# Patient Record
Sex: FEMALE | Race: WHITE | NOT HISPANIC OR LATINO | Employment: STUDENT | URBAN - NONMETROPOLITAN AREA
[De-identification: names, ages, dates, MRNs, and addresses within clinical notes are randomized per-mention and may not be internally consistent; named-entity substitution may affect disease eponyms.]

---

## 2017-03-06 ENCOUNTER — OFFICE VISIT (OUTPATIENT)
Dept: CARDIOLOGY | Facility: CLINIC | Age: 22
End: 2017-03-06

## 2017-03-06 VITALS
WEIGHT: 191 LBS | HEIGHT: 67 IN | OXYGEN SATURATION: 96 % | SYSTOLIC BLOOD PRESSURE: 132 MMHG | HEART RATE: 67 BPM | DIASTOLIC BLOOD PRESSURE: 77 MMHG | BODY MASS INDEX: 29.98 KG/M2

## 2017-03-06 DIAGNOSIS — R00.2 PALPITATIONS: Primary | ICD-10-CM

## 2017-03-06 DIAGNOSIS — R00.0 TACHYCARDIA: ICD-10-CM

## 2017-03-06 PROCEDURE — 99213 OFFICE O/P EST LOW 20 MIN: CPT | Performed by: PHYSICIAN ASSISTANT

## 2017-03-06 NOTE — PROGRESS NOTES
Problem list     Subjective   Essence Law is a 21 y.o. female     Chief Complaint   Patient presents with   • Follow-up     presents as a follow up       HPI  The patient presents back today for routine follow-up.  We had seen her in the past were we attempted to put her on atenolol because of tachycardia and palpitations.  She cannot tolerate that because of fatigue and dizziness.  She does note that her symptoms improved however with regards to palpitations and tachycardia.  We discontinue that at last visit but her on diltiazem 30 mg twice a day.  She reports that she has done much better with that medication.  She has far fewer episodes of tachycardia and palpitations.  Wouldn't work and stress, she will have some degree of tachycardia, not nearly to the severity that she had noted in the past.  She does have occasional dizziness which is mostly vertigo at times.  She has no syncope.  She relates to no PND or orthopnea.  She has no further complaints otherwise.    Current Outpatient Prescriptions   Medication Sig Dispense Refill   • cetirizine (ZyrTEC) 10 MG tablet Take 1 tablet by mouth daily.     • diltiaZEM (CARDIZEM) 30 MG tablet Take 1 tablet by mouth 2 (Two) Times a Day. 60 tablet 5   • DOCQLACE 100 MG capsule daily.     • EPIPEN 2-TOD 0.3 MG/0.3ML solution auto-injector injection prn     • ibuprofen (ADVIL,MOTRIN) 800 MG tablet      • lansoprazole (PREVACID) 30 MG capsule Take 1 capsule by mouth every morning.     • linaclotide (LINZESS) 290 MCG capsule capsule Take 145 mcg by mouth Every Morning Before Breakfast.     • montelukast (SINGULAIR) 10 MG tablet Take 1 tablet by mouth every night.     • ondansetron (ZOFRAN) 4 MG tablet Take 1 tablet by mouth every 8 (eight) hours as needed.     • polyethylene glycol (MIRALAX) packet Take  by mouth daily. Mix 1 packet in 8 ounces of liquid and drink once daily     • ranitidine (ZANTAC) 150 MG tablet Take 1 tablet by mouth 2 (two) times a day.     •  "sertraline (ZOLOFT) 100 MG tablet Daily.     • VENTOLIN  (90 BASE) MCG/ACT inhaler prn       No current facility-administered medications for this visit.        Azithromycin and Peanuts [peanut oil]    Past Medical History   Diagnosis Date   • Anxiety    • Depression    • GERD (gastroesophageal reflux disease)    • Palpitations        Social History     Social History   • Marital status: Single     Spouse name: N/A   • Number of children: N/A   • Years of education: N/A     Occupational History   • Not on file.     Social History Main Topics   • Smoking status: Never Smoker   • Smokeless tobacco: Never Used   • Alcohol use No   • Drug use: No   • Sexual activity: Not on file     Other Topics Concern   • Not on file     Social History Narrative       Family History   Problem Relation Age of Onset   • Diabetes Mother      borderline   • COPD Mother    • Coronary artery disease Mother    • Deep vein thrombosis Mother    • Depression Mother    • Hypertension Mother      essential   • Hyperlipidemia Mother    • COPD Father    • LACEY disease Father    • Lung cancer Father        Review of Systems   Constitutional: Positive for fatigue.   HENT: Positive for ear pain and sinus pressure.    Eyes: Negative.    Respiratory: Positive for shortness of breath ( has sob more at work).    Cardiovascular: Positive for chest pain (more at work ) and palpitations. Negative for leg swelling.   Gastrointestinal: Positive for constipation.   Endocrine: Negative.    Genitourinary: Negative.    Musculoskeletal: Negative.    Skin: Negative.    Allergic/Immunologic: Positive for environmental allergies.   Neurological: Positive for headaches.   Hematological: Bruises/bleeds easily.   Psychiatric/Behavioral: The patient is nervous/anxious.        Objective   Visit Vitals   • /77 (BP Location: Left arm, Patient Position: Sitting)   • Pulse 67   • Ht 67\" (170.2 cm)   • Wt 191 lb (86.6 kg)   • SpO2 96%   • BMI 29.91 kg/m2     Lab " Results (most recent)     None        Physical Exam   Constitutional: She is oriented to person, place, and time. She appears well-developed and well-nourished. No distress.   HENT:   Head: Normocephalic and atraumatic.   Eyes: Conjunctivae and EOM are normal. Pupils are equal, round, and reactive to light.   Neck: Normal range of motion. Neck supple. No JVD present. No tracheal deviation present.   Cardiovascular: Normal rate, regular rhythm, normal heart sounds and intact distal pulses.    Pulmonary/Chest: Effort normal and breath sounds normal.   Abdominal: Soft. Bowel sounds are normal. She exhibits no distension and no mass. There is no tenderness. There is no rebound and no guarding.   Musculoskeletal: Normal range of motion. She exhibits no edema, tenderness or deformity.   Neurological: She is alert and oriented to person, place, and time.   Skin: Skin is warm and dry. No rash noted. No erythema. No pallor.   Psychiatric: She has a normal mood and affect. Her behavior is normal. Judgment and thought content normal.   Nursing note and vitals reviewed.        Procedure   Procedures       Assessment/Plan      Diagnosis Plan   1. Palpitations     2. Tachycardia       The patient seems improved on diltiazem.  Her palpitations and tachycardia are largely nonproblematic.  Prior workup is been unremarkable.  I feel nothing further is indicated.  We'll see the patient back in 6 months, sooner if indicated by course.

## 2017-10-03 ENCOUNTER — OFFICE VISIT (OUTPATIENT)
Dept: CARDIOLOGY | Facility: CLINIC | Age: 22
End: 2017-10-03

## 2017-10-03 VITALS
OXYGEN SATURATION: 100 % | HEIGHT: 67 IN | HEART RATE: 68 BPM | SYSTOLIC BLOOD PRESSURE: 128 MMHG | DIASTOLIC BLOOD PRESSURE: 74 MMHG | WEIGHT: 187 LBS | BODY MASS INDEX: 29.35 KG/M2

## 2017-10-03 DIAGNOSIS — R00.0 TACHYCARDIA: ICD-10-CM

## 2017-10-03 DIAGNOSIS — R00.2 PALPITATIONS: Primary | ICD-10-CM

## 2017-10-03 PROCEDURE — 99213 OFFICE O/P EST LOW 20 MIN: CPT | Performed by: PHYSICIAN ASSISTANT

## 2017-10-03 RX ORDER — NORETHINDRONE AND ETHINYL ESTRADIOL 7 DAYS X 3
KIT ORAL NIGHTLY
COMMUNITY
Start: 2017-09-05

## 2017-10-03 RX ORDER — AZELASTINE 1 MG/ML
SPRAY, METERED NASAL
COMMUNITY
Start: 2017-09-06 | End: 2020-01-29

## 2017-10-03 NOTE — PROGRESS NOTES
Problem list     Subjective   Essence Law is a 21 y.o. female     Chief Complaint   Patient presents with   • Palpitations     Here for 6 mo. f/u   • Heartburn       HPI  The patient presents back in today for routine follow-up.  Her palpitations and tachycardic episodes of been very minimal since taking diltiazem at low dose.  The patient currently has no chest pain.  She reports stable dyspnea.  She relates to no PND orthopnea.  Previous cardiac testing including echocardiogram have been unremarkable.  Her event monitor indicated sinus tachycardia.  She has no further complaints otherwise and again feels that she is doing well current medical regimen.    Current Outpatient Prescriptions   Medication Sig Dispense Refill   • azelastine (ASTELIN) 0.1 % nasal spray 1 spray each nare bid     • cetirizine (ZyrTEC) 10 MG tablet Take 1 tablet by mouth daily.     • diltiaZEM (CARDIZEM) 30 MG tablet Take 1 tablet by mouth 2 (Two) Times a Day. 60 tablet 5   • DOCQLACE 100 MG capsule Daily As Needed.     • ibuprofen (ADVIL,MOTRIN) 800 MG tablet prn     • linaclotide (LINZESS) 290 MCG capsule capsule Take 145 mcg by mouth Daily As Needed.     • montelukast (SINGULAIR) 10 MG tablet Take 1 tablet by mouth every night.     • NORTREL 7/7/7 0.5/0.75/1-35 MG-MCG per tablet Every Night.     • ondansetron (ZOFRAN) 4 MG tablet Take 1 tablet by mouth every 8 (eight) hours as needed.     • ranitidine (ZANTAC) 150 MG tablet Take 1 tablet by mouth 2 (two) times a day.     • sertraline (ZOLOFT) 100 MG tablet Daily.     • VENTOLIN  (90 BASE) MCG/ACT inhaler prn     • EPIPEN 2-TOD 0.3 MG/0.3ML solution auto-injector injection prn       No current facility-administered medications for this visit.        Azithromycin and Peanuts [peanut oil]    Past Medical History:   Diagnosis Date   • Anxiety    • Depression    • GERD (gastroesophageal reflux disease)    • Palpitations        Social History     Social History   • Marital status:  "Single     Spouse name: N/A   • Number of children: N/A   • Years of education: N/A     Occupational History   • Not on file.     Social History Main Topics   • Smoking status: Never Smoker   • Smokeless tobacco: Never Used   • Alcohol use No   • Drug use: No   • Sexual activity: Not on file     Other Topics Concern   • Not on file     Social History Narrative       Family History   Problem Relation Age of Onset   • Diabetes Mother      borderline   • COPD Mother    • Coronary artery disease Mother    • Deep vein thrombosis Mother    • Depression Mother    • Hypertension Mother      essential   • Hyperlipidemia Mother    • COPD Father    • LACEY disease Father    • Lung cancer Father        Review of Systems   Constitutional: Negative.    HENT: Negative.    Eyes: Positive for visual disturbance (supposed to wear glasses).   Respiratory: Positive for shortness of breath (occas.).    Cardiovascular: Positive for palpitations (once). Negative for leg swelling.   Gastrointestinal: Negative.    Endocrine: Negative.    Genitourinary: Negative.    Musculoskeletal: Negative.    Skin: Negative.    Allergic/Immunologic: Positive for environmental allergies.   Neurological: Positive for dizziness and light-headedness.   Hematological: Bruises/bleeds easily (bruise).   Psychiatric/Behavioral: Negative.        Objective   /74 (BP Location: Left arm, Patient Position: Sitting)  Pulse 68  Ht 67\" (170.2 cm)  Wt 187 lb (84.8 kg)  SpO2 100%  BMI 29.29 kg/m2  Lab Results (most recent)     None        Physical Exam   Constitutional: She is oriented to person, place, and time. She appears well-developed and well-nourished. No distress.   HENT:   Head: Normocephalic and atraumatic.   Eyes: Conjunctivae and EOM are normal. Pupils are equal, round, and reactive to light.   Neck: Normal range of motion. Neck supple. No JVD present. No tracheal deviation present.   Cardiovascular: Normal rate, regular rhythm, normal heart sounds and " intact distal pulses.    Pulmonary/Chest: Effort normal and breath sounds normal.   Abdominal: Soft. Bowel sounds are normal. She exhibits no distension and no mass. There is no tenderness. There is no rebound and no guarding.   Musculoskeletal: Normal range of motion. She exhibits no edema, tenderness or deformity.   Neurological: She is alert and oriented to person, place, and time.   Skin: Skin is warm and dry. No rash noted. No erythema. No pallor.   Psychiatric: She has a normal mood and affect. Her behavior is normal. Judgment and thought content normal.   Nursing note and vitals reviewed.        Procedure   Procedures       Assessment/Plan      Diagnosis Plan   1. Palpitations     2. Tachycardia       The patient is doing well current medical regimen.  Symptoms of dysrhythmias are minimal.  Her palpitations and tachycardia have been basically nonexistent since starting diltiazem..  Cardiac workup was benign.  I feel nothing further is indicated.  We will see the patient back in 6 was, sooner if indicated by course.

## 2018-05-02 ENCOUNTER — OFFICE VISIT (OUTPATIENT)
Dept: CARDIOLOGY | Facility: CLINIC | Age: 23
End: 2018-05-02

## 2018-05-02 VITALS
DIASTOLIC BLOOD PRESSURE: 83 MMHG | HEART RATE: 85 BPM | SYSTOLIC BLOOD PRESSURE: 120 MMHG | OXYGEN SATURATION: 99 % | HEIGHT: 67 IN | BODY MASS INDEX: 31.33 KG/M2 | WEIGHT: 199.6 LBS

## 2018-05-02 DIAGNOSIS — R00.2 PALPITATIONS: ICD-10-CM

## 2018-05-02 DIAGNOSIS — I10 ESSENTIAL HYPERTENSION: ICD-10-CM

## 2018-05-02 DIAGNOSIS — R07.2 PRECORDIAL PAIN: Primary | ICD-10-CM

## 2018-05-02 PROCEDURE — 99213 OFFICE O/P EST LOW 20 MIN: CPT | Performed by: PHYSICIAN ASSISTANT

## 2018-05-02 PROCEDURE — 93000 ELECTROCARDIOGRAM COMPLETE: CPT | Performed by: PHYSICIAN ASSISTANT

## 2018-05-02 NOTE — PATIENT INSTRUCTIONS

## 2018-05-02 NOTE — PROGRESS NOTES
Problem list     Subjective   Essence Law is a 22 y.o. female     Chief Complaint   Patient presents with   • Follow-up     patient appears in office today for follow up    • Palpitations       HPI  The patient presents back in today for routine evaluation follow-up.  She continues to do well from cardiovascular standpoint.  Since taking diltiazem, she has had no chest pain of any significance.  Her palpitations have resolved.  Blood pressures are now very well-controlled.  She was hypertensive for some time.  The patient has stable dyspnea.  She denies PND orthopnea.  Exercise capacity is adequate and without limitation.  She describes dizziness at times which she was concerned could be medication related.  With further evaluation however the patient clearly has vertigo.  I reviewed this with her.  She has no further complaints otherwise and feels that she is doing well.    Current Outpatient Prescriptions   Medication Sig Dispense Refill   • azelastine (ASTELIN) 0.1 % nasal spray 1 spray each nare bid     • diltiaZEM (CARDIZEM) 30 MG tablet Take 1 tablet by mouth 2 (Two) Times a Day. 60 tablet 5   • EPIPEN 2-TOD 0.3 MG/0.3ML solution auto-injector injection prn     • fluticasone (VERAMYST) 27.5 MCG/SPRAY nasal spray 2 sprays into each nostril Daily.     • montelukast (SINGULAIR) 10 MG tablet Take 1 tablet by mouth every night.     • NORTREL 7/7/7 0.5/0.75/1-35 MG-MCG per tablet Every Night.     • ranitidine (ZANTAC) 150 MG tablet Take 1 tablet by mouth 2 (two) times a day.     • sertraline (ZOLOFT) 100 MG tablet Take 100 mg by mouth Daily.       No current facility-administered medications for this visit.        Azithromycin and Peanuts [peanut oil]    Past Medical History:   Diagnosis Date   • Anxiety    • Depression    • GERD (gastroesophageal reflux disease)    • Palpitations        Social History     Social History   • Marital status: Single     Spouse name: N/A   • Number of children: N/A   • Years of  education: N/A     Occupational History   • Not on file.     Social History Main Topics   • Smoking status: Never Smoker   • Smokeless tobacco: Never Used   • Alcohol use No   • Drug use: No   • Sexual activity: Defer     Other Topics Concern   • Not on file     Social History Narrative   • No narrative on file       Family History   Problem Relation Age of Onset   • Diabetes Mother      borderline   • COPD Mother    • Coronary artery disease Mother    • Deep vein thrombosis Mother    • Depression Mother    • Hypertension Mother      essential   • Hyperlipidemia Mother    • COPD Father    • LACEY disease Father    • Lung cancer Father        Review of Systems   Constitutional: Negative.  Negative for fatigue.   HENT: Positive for rhinorrhea (runny nose due to allergies). Negative for congestion, sneezing and sore throat.    Eyes: Negative.  Negative for visual disturbance.   Respiratory: Positive for shortness of breath (with exertion only). Negative for apnea, cough, chest tightness and wheezing.    Cardiovascular: Positive for palpitations (occasional palpitations). Negative for chest pain (denies CP) and leg swelling.   Gastrointestinal: Negative.  Negative for abdominal distention, abdominal pain, nausea and vomiting.   Endocrine: Negative.  Negative for cold intolerance, heat intolerance, polyphagia and polyuria.   Genitourinary: Negative.  Negative for difficulty urinating, frequency and urgency.   Musculoskeletal: Negative.  Negative for arthralgias, back pain, myalgias, neck pain and neck stiffness.   Skin: Negative.  Negative for rash and wound.   Allergic/Immunologic: Positive for environmental allergies (seasonal ) and food allergies (peanuts and shellfish).   Neurological: Negative.  Negative for dizziness, weakness, light-headedness and headaches.   Hematological: Negative.  Does not bruise/bleed easily.   Psychiatric/Behavioral: Negative for agitation, confusion and sleep disturbance (denies waking up  "smothering/SOA). The patient is nervous/anxious (easily nervous/anxious).        Objective   Vitals:    05/02/18 1044   BP: 120/83   BP Location: Left arm   Patient Position: Sitting   Pulse: 85   SpO2: 99%   Weight: 90.5 kg (199 lb 9.6 oz)   Height: 170.2 cm (67\")      /83 (BP Location: Left arm, Patient Position: Sitting)   Pulse 85   Ht 170.2 cm (67\")   Wt 90.5 kg (199 lb 9.6 oz)   SpO2 99%   BMI 31.26 kg/m²    Lab Results (most recent)     None        Physical Exam   Constitutional: She is oriented to person, place, and time. She appears well-developed and well-nourished. No distress.   HENT:   Head: Normocephalic and atraumatic.   Eyes: Conjunctivae and EOM are normal. Pupils are equal, round, and reactive to light.   Neck: Normal range of motion. Neck supple. No JVD present. No tracheal deviation present.   Cardiovascular: Normal rate, regular rhythm, normal heart sounds and intact distal pulses.    Pulmonary/Chest: Effort normal and breath sounds normal.   Abdominal: Soft. Bowel sounds are normal. She exhibits no distension and no mass. There is no tenderness. There is no rebound and no guarding.   Musculoskeletal: Normal range of motion. She exhibits no edema, tenderness or deformity.   Neurological: She is alert and oriented to person, place, and time.   Skin: Skin is warm and dry. No rash noted. No erythema. No pallor.   Psychiatric: She has a normal mood and affect. Her behavior is normal. Judgment and thought content normal.   Nursing note and vitals reviewed.        Procedure     ECG 12 Lead  Date/Time: 5/2/2018 11:13 AM  Performed by: SESAR LAMAS  Authorized by: SESAR LAMAS   Comments: Sinus rhythm at 69, normal axis, early repolarization changes, no acute changes noted.               Assessment/Plan      Diagnosis Plan   1. Precordial pain     2. Palpitations     3. Essential hypertension       The patient is stable at this time.  Palpitations are minimal on diltiazem.  Blood " pressures are now running normal with that medication.  I will make no changes.  We will see her back in 6 months, sooner if indicated by course.  She will call for complications prior to follow-up.             Patient's Body mass index is 31.26 kg/m². BMI is above normal parameters. Follow-up plan includes:  educational material and referral to primary care.             Electronically signed by:

## 2019-01-16 ENCOUNTER — OFFICE VISIT (OUTPATIENT)
Dept: CARDIOLOGY | Facility: CLINIC | Age: 24
End: 2019-01-16

## 2019-01-16 VITALS
HEIGHT: 67 IN | SYSTOLIC BLOOD PRESSURE: 134 MMHG | HEART RATE: 71 BPM | BODY MASS INDEX: 33.74 KG/M2 | WEIGHT: 215 LBS | OXYGEN SATURATION: 100 % | DIASTOLIC BLOOD PRESSURE: 76 MMHG

## 2019-01-16 DIAGNOSIS — R00.0 TACHYCARDIA: ICD-10-CM

## 2019-01-16 DIAGNOSIS — R07.2 PRECORDIAL PAIN: ICD-10-CM

## 2019-01-16 DIAGNOSIS — R00.2 PALPITATIONS: Primary | ICD-10-CM

## 2019-01-16 PROCEDURE — 99213 OFFICE O/P EST LOW 20 MIN: CPT | Performed by: PHYSICIAN ASSISTANT

## 2019-01-16 RX ORDER — CETIRIZINE HYDROCHLORIDE 10 MG/1
TABLET ORAL
COMMUNITY

## 2019-01-16 RX ORDER — IBUPROFEN 800 MG/1
TABLET ORAL
Refills: 5 | COMMUNITY
Start: 2018-12-31 | End: 2022-03-08

## 2019-01-16 RX ORDER — ALBUTEROL SULFATE 90 UG/1
AEROSOL, METERED RESPIRATORY (INHALATION)
Refills: 11 | COMMUNITY
Start: 2018-12-27

## 2019-01-16 NOTE — PATIENT INSTRUCTIONS

## 2019-01-16 NOTE — PROGRESS NOTES
Problem list     Subjective   Essence Law is a 23 y.o. female     Chief Complaint   Patient presents with   • Palpitations   • Hypertension       HPI  The patient presents in today for routine evaluation follow-up.  We have seen in the past in setting of chest pain and tachycardia/palpitations.  Previous cardiac workup was unremarkable as outlined in previous notes.  The patient subsequently started on diltiazem at a very low-dose.  Symptoms of chest discomfort and palpitations/tachycardia of largely resolved.  She has only rare episodes of chest discomfort, very minor at this time.  She also has only very rare episodes of palpitations and tachycardia.  The patient has no dizziness or syncope.  She reports adequate exercise capacity.  She has stable dyspnea.  She has no further complaints otherwise and feels she is doing well at this time.    Current Outpatient Medications   Medication Sig Dispense Refill   • azelastine (ASTELIN) 0.1 % nasal spray 1 spray each nare bid     • cetirizine (zyrTEC) 10 MG tablet cetirizine 10 mg tablet   Take 1 tablet every day by oral route.     • diltiaZEM (CARDIZEM) 30 MG tablet Take 1 tablet by mouth 2 (Two) Times a Day. 60 tablet 5   • EPIPEN 2-TOD 0.3 MG/0.3ML solution auto-injector injection prn     • fluticasone (VERAMYST) 27.5 MCG/SPRAY nasal spray 2 sprays into each nostril Daily.     • ibuprofen (ADVIL,MOTRIN) 800 MG tablet TAKE 1 TABLET BY MOUTH UP TO THREE TIMES A DAY WITH FOOD  5   • montelukast (SINGULAIR) 10 MG tablet Take 1 tablet by mouth every night.     • NORTREL 7/7/7 0.5/0.75/1-35 MG-MCG per tablet Every Night.     • ranitidine (ZANTAC) 150 MG tablet Take 1 tablet by mouth 2 (two) times a day.     • sertraline (ZOLOFT) 100 MG tablet Take 100 mg by mouth Daily.     • VENTOLIN  (90 Base) MCG/ACT inhaler INHALE 2 PUFFS BY MOUTH FOUR TIMES A DAY AS NEEDED FOR COUGH WHEEZE  11     No current facility-administered medications for this visit.         Azithromycin and Peanuts [peanut oil]    Past Medical History:   Diagnosis Date   • Anxiety    • Depression    • GERD (gastroesophageal reflux disease)    • Palpitations        Social History     Socioeconomic History   • Marital status: Single     Spouse name: Not on file   • Number of children: Not on file   • Years of education: Not on file   • Highest education level: Not on file   Social Needs   • Financial resource strain: Not on file   • Food insecurity - worry: Not on file   • Food insecurity - inability: Not on file   • Transportation needs - medical: Not on file   • Transportation needs - non-medical: Not on file   Occupational History   • Not on file   Tobacco Use   • Smoking status: Never Smoker   • Smokeless tobacco: Never Used   Substance and Sexual Activity   • Alcohol use: No   • Drug use: No   • Sexual activity: Defer   Other Topics Concern   • Not on file   Social History Narrative   • Not on file       Family History   Problem Relation Age of Onset   • Diabetes Mother         borderline   • COPD Mother    • Coronary artery disease Mother    • Deep vein thrombosis Mother    • Depression Mother    • Hypertension Mother         essential   • Hyperlipidemia Mother    • COPD Father    • LACEY disease Father    • Lung cancer Father        Review of Systems   Constitutional: Positive for fatigue (easily fatigued).   HENT: Negative.  Negative for congestion, rhinorrhea, sneezing and sore throat.    Eyes: Negative.  Negative for visual disturbance.   Respiratory: Positive for shortness of breath (easily SOA; worse on exertion ). Negative for apnea, cough, chest tightness and wheezing.    Cardiovascular: Positive for palpitations (occasional palpitations). Negative for chest pain (denies CP) and leg swelling.   Gastrointestinal: Negative.  Negative for abdominal distention, abdominal pain, nausea and vomiting.   Endocrine: Negative.  Negative for cold intolerance and heat intolerance.   Genitourinary:  "Negative.  Negative for difficulty urinating, frequency and urgency.   Musculoskeletal: Negative.  Negative for arthralgias, back pain, myalgias, neck pain and neck stiffness.   Skin: Negative.  Negative for rash and wound.   Allergic/Immunologic: Positive for environmental allergies (seasonal allergies) and food allergies (nuts).   Neurological: Negative.  Negative for dizziness, syncope, weakness, light-headedness and headaches.   Hematological: Negative.  Does not bruise/bleed easily.   Psychiatric/Behavioral: Positive for agitation (easily agitated). Negative for confusion and sleep disturbance (denies waking up smothering/SOA). The patient is nervous/anxious (easily anxious).        Objective   Vitals:    01/16/19 1528   BP: 134/76   BP Location: Left arm   Patient Position: Sitting   Cuff Size: Adult   Pulse: 71   SpO2: 100%   Weight: 97.5 kg (215 lb)   Height: 170.2 cm (67.01\")      /76 (BP Location: Left arm, Patient Position: Sitting, Cuff Size: Adult)   Pulse 71   Ht 170.2 cm (67.01\")   Wt 97.5 kg (215 lb)   SpO2 100%   BMI 33.67 kg/m²    Lab Results (most recent)     None        Physical Exam   Constitutional: She is oriented to person, place, and time. She appears well-developed and well-nourished. No distress.   HENT:   Head: Normocephalic and atraumatic.   Eyes: Conjunctivae and EOM are normal. Pupils are equal, round, and reactive to light.   Neck: Normal range of motion. Neck supple. No JVD present. No tracheal deviation present.   Cardiovascular: Normal rate, regular rhythm, normal heart sounds and intact distal pulses.   Pulmonary/Chest: Effort normal and breath sounds normal.   Abdominal: Soft. Bowel sounds are normal. She exhibits no distension and no mass. There is no tenderness. There is no rebound and no guarding.   Musculoskeletal: Normal range of motion. She exhibits no edema, tenderness or deformity.   Neurological: She is alert and oriented to person, place, and time.   Skin: " Skin is warm and dry. No rash noted. No erythema. No pallor.   Psychiatric: She has a normal mood and affect. Her behavior is normal. Judgment and thought content normal.   Nursing note and vitals reviewed.        Procedure   Procedures       Assessment/Plan      Diagnosis Plan   1. Palpitations     2. Tachycardia     3. Precordial pain           The patient is doing well on diltiazem therapy with very infrequent episodes of chest pain, palpitations, tachycardia.  She is doing well, I will make no changes to medical regimen.  We can see the patient back in 6 months, sooner if indicated by course.  She will call for any issues prior to follow-up.       Patient's Body mass index is 33.67 kg/m². BMI is above normal parameters. Recommendations include: educational material and referral to primary care.             Electronically signed by:

## 2019-07-24 ENCOUNTER — OFFICE VISIT (OUTPATIENT)
Dept: CARDIOLOGY | Facility: CLINIC | Age: 24
End: 2019-07-24

## 2019-07-24 VITALS
HEART RATE: 96 BPM | HEIGHT: 67 IN | BODY MASS INDEX: 33.56 KG/M2 | DIASTOLIC BLOOD PRESSURE: 84 MMHG | OXYGEN SATURATION: 97 % | WEIGHT: 213.8 LBS | SYSTOLIC BLOOD PRESSURE: 135 MMHG

## 2019-07-24 DIAGNOSIS — R00.2 PALPITATIONS: Primary | ICD-10-CM

## 2019-07-24 DIAGNOSIS — R00.0 TACHYCARDIA: ICD-10-CM

## 2019-07-24 DIAGNOSIS — R06.02 SHORTNESS OF BREATH: ICD-10-CM

## 2019-07-24 PROCEDURE — 99213 OFFICE O/P EST LOW 20 MIN: CPT | Performed by: PHYSICIAN ASSISTANT

## 2019-07-24 RX ORDER — ACETAMINOPHEN 160 MG
2000 TABLET,DISINTEGRATING ORAL DAILY
Refills: 12 | COMMUNITY
Start: 2019-07-08 | End: 2022-03-08

## 2019-07-24 NOTE — PATIENT INSTRUCTIONS

## 2019-07-24 NOTE — PROGRESS NOTES
Problem list     Subjective   Essence Law is a 23 y.o. female     Chief Complaint   Patient presents with   • Palpitations       HPI    The patient presents in today for routine evaluation follow-up.  We have seen in the past in setting of palpitations and tachycardia.  She also has had chest pain shortness of air in the past.  We did schedule her for evaluation previously.  Noninvasive cardiac work-up was benign at that time.  Because of ongoing symptoms of palpitations/tachycardia, we did start the patient on low-dose diltiazem and have followed her longitudinally through the clinic.  She has responded well for the most part to diltiazem therapy.  Currently, the patient reports only rare palpitations/tachycardia.  She has stable dyspnea.  Her chest pain is resolved.  She has no dizziness or syncope.  She has no further complaints otherwise at this time.  Current Outpatient Medications   Medication Sig Dispense Refill   • azelastine (ASTELIN) 0.1 % nasal spray 1 spray each nare bid     • cetirizine (zyrTEC) 10 MG tablet cetirizine 10 mg tablet   Take 1 tablet every day by oral route.     • Cholecalciferol (VITAMIN D3) 2000 units capsule Take 2,000 Units by mouth Daily.  12   • diltiaZEM (CARDIZEM) 30 MG tablet Take 1 tablet by mouth 2 (Two) Times a Day. 60 tablet 5   • EPIPEN 2-TOD 0.3 MG/0.3ML solution auto-injector injection prn     • fluticasone (VERAMYST) 27.5 MCG/SPRAY nasal spray 2 sprays into each nostril Daily.     • ibuprofen (ADVIL,MOTRIN) 800 MG tablet TAKE 1 TABLET BY MOUTH UP TO THREE TIMES A DAY WITH FOOD  5   • montelukast (SINGULAIR) 10 MG tablet Take 1 tablet by mouth every night.     • NORTREL 7/7/7 0.5/0.75/1-35 MG-MCG per tablet Every Night.     • ranitidine (ZANTAC) 150 MG tablet Take 1 tablet by mouth 2 (two) times a day.     • sertraline (ZOLOFT) 100 MG tablet Take 100 mg by mouth Daily.     • VENTOLIN  (90 Base) MCG/ACT inhaler INHALE 2 PUFFS BY MOUTH FOUR TIMES A DAY AS NEEDED  FOR COUGH WHEEZE  11   • vitamin E 200 UNIT capsule Take 400 Units by mouth Daily.  0     No current facility-administered medications for this visit.        Azithromycin and Peanuts [peanut oil]    Past Medical History:   Diagnosis Date   • Anxiety    • Depression    • GERD (gastroesophageal reflux disease)    • Palpitations        Social History     Socioeconomic History   • Marital status: Single     Spouse name: Not on file   • Number of children: Not on file   • Years of education: Not on file   • Highest education level: Not on file   Tobacco Use   • Smoking status: Never Smoker   • Smokeless tobacco: Never Used   Substance and Sexual Activity   • Alcohol use: No   • Drug use: No   • Sexual activity: Not Currently     Comment: On the pill to control periods       Family History   Problem Relation Age of Onset   • Diabetes Mother         borderline   • COPD Mother    • Coronary artery disease Mother    • Deep vein thrombosis Mother    • Depression Mother    • Hypertension Mother         essential   • Hyperlipidemia Mother    • Heart disease Mother    • COPD Father    • LACEY disease Father    • Lung cancer Father    • Heart attack Paternal Aunt         Congestive haert failure   • Heart disease Paternal Aunt    • Heart failure Paternal Aunt    • Heart attack Paternal Uncle         Heart  attack   • Heart failure Paternal Uncle    • Heart attack Paternal Uncle         Massive heart attack,    • Heart failure Paternal Uncle    • Heart attack Paternal Grandfather         Massive heart attack,    • Heart disease Paternal Grandfather    • Heart attack Paternal Grandmother         Massive hesrt attack,    • Heart disease Paternal Grandmother    • Heart failure Paternal Uncle         Massive heart attack,        Review of Systems   Constitutional: Positive for fatigue (easily fatigued).   HENT: Negative.  Negative for congestion, rhinorrhea and sneezing.    Eyes: Negative.  Negative for  "visual disturbance.   Respiratory: Positive for shortness of breath (easily SOA on exertion only). Negative for cough, chest tightness and wheezing.    Cardiovascular: Positive for palpitations (occasional palpitations). Negative for chest pain and leg swelling.   Gastrointestinal: Negative.  Negative for abdominal pain, nausea and vomiting.   Endocrine: Negative.  Negative for cold intolerance and heat intolerance.   Genitourinary: Negative.  Negative for difficulty urinating, frequency and urgency.   Musculoskeletal: Negative.  Negative for arthralgias, back pain, neck pain and neck stiffness.   Skin: Negative.  Negative for rash and wound.   Allergic/Immunologic: Positive for food allergies (peanuts). Negative for environmental allergies.   Neurological: Negative.  Negative for dizziness, syncope, weakness, light-headedness and headaches.   Hematological: Bruises/bleeds easily (bruises easily).   Psychiatric/Behavioral: Positive for agitation (easily agitated) and confusion (easily confused). Negative for sleep disturbance (denies waking up smothering/SOA). The patient is not nervous/anxious.        Objective   Vitals:    07/24/19 1337   BP: 135/84   BP Location: Left arm   Patient Position: Sitting   Pulse: 96   SpO2: 97%   Weight: 97 kg (213 lb 12.8 oz)   Height: 170.2 cm (67\")      /84 (BP Location: Left arm, Patient Position: Sitting)   Pulse 96   Ht 170.2 cm (67\")   Wt 97 kg (213 lb 12.8 oz)   SpO2 97%   BMI 33.49 kg/m²    Lab Results (most recent)     None        Physical Exam   Constitutional: She is oriented to person, place, and time. She appears well-developed and well-nourished. No distress.   HENT:   Head: Normocephalic and atraumatic.   Eyes: Conjunctivae and EOM are normal. Pupils are equal, round, and reactive to light.   Neck: Normal range of motion. Neck supple. No JVD present. No tracheal deviation present.   Cardiovascular: Normal rate, regular rhythm, normal heart sounds and intact " distal pulses.   Pulmonary/Chest: Effort normal and breath sounds normal.   Abdominal: Soft. Bowel sounds are normal. She exhibits no distension and no mass. There is no tenderness. There is no rebound and no guarding.   Musculoskeletal: Normal range of motion. She exhibits no edema, tenderness or deformity.   Neurological: She is alert and oriented to person, place, and time.   Skin: Skin is warm and dry. No rash noted. No erythema. No pallor.   Psychiatric: She has a normal mood and affect. Her behavior is normal. Judgment and thought content normal.   Nursing note and vitals reviewed.        Procedure   Procedures       Assessment/Plan      Diagnosis Plan   1. Palpitations     2. Tachycardia     3. Shortness of breath         1.  The patient continues to do well with diltiazem therapy.  This seems to be suppressing the majority of symptoms of palpitations and tachycardia.  I will continue that at this time without change.    2.  Previous cardiac work-up is been benign.  I do not feel anything further is indicated.  Her chest pain is resolved.  Her dyspnea is at baseline for her.  She has no symptoms otherwise.  I feel no further work-up is warranted.    3.  We will see the patient back on 6-month intervals.  Again I will continue medications without change.  She will call for any issues.  Otherwise, follow-up as above.            Patient's Body mass index is 33.49 kg/m². BMI is above normal parameters. Recommendations include: educational material and referral to primary care.             Electronically signed by:

## 2020-01-29 ENCOUNTER — OFFICE VISIT (OUTPATIENT)
Dept: CARDIOLOGY | Facility: CLINIC | Age: 25
End: 2020-01-29

## 2020-01-29 VITALS
SYSTOLIC BLOOD PRESSURE: 122 MMHG | BODY MASS INDEX: 34.44 KG/M2 | OXYGEN SATURATION: 99 % | HEART RATE: 80 BPM | HEIGHT: 67 IN | WEIGHT: 219.4 LBS | DIASTOLIC BLOOD PRESSURE: 75 MMHG

## 2020-01-29 DIAGNOSIS — R00.0 TACHYCARDIA: ICD-10-CM

## 2020-01-29 DIAGNOSIS — R00.2 PALPITATIONS: Primary | ICD-10-CM

## 2020-01-29 DIAGNOSIS — R06.02 SHORTNESS OF BREATH: ICD-10-CM

## 2020-01-29 PROCEDURE — 93000 ELECTROCARDIOGRAM COMPLETE: CPT | Performed by: PHYSICIAN ASSISTANT

## 2020-01-29 PROCEDURE — 99213 OFFICE O/P EST LOW 20 MIN: CPT | Performed by: PHYSICIAN ASSISTANT

## 2020-01-29 NOTE — PROGRESS NOTES
Problem list     Subjective   Essence Law is a 24 y.o. female     Chief Complaint   Patient presents with   • Palpitations     here for 6 month f/u       HPI  The patient presents in today for evaluation and follow-up.  We have seen this patient historically because of chest pain, palpitations, tachycardia, and dyspnea.  Previous noninvasive work-up has been very much benign.  We did evaluate the patient for dysrhythmic substrates.  Monitor telemetry was very much benign.  She has since been treated with very low-dose diltiazem.  Symptoms of palpitations/tachycardia have basically resolved.  Currently, the patient has no chest pain.  She has stable dyspnea.  She has no further complaints otherwise.    Current Outpatient Medications on File Prior to Visit   Medication Sig Dispense Refill   • cetirizine (zyrTEC) 10 MG tablet cetirizine 10 mg tablet   Take 1 tablet every day by oral route.     • Cholecalciferol (VITAMIN D3) 2000 units capsule Take 2,000 Units by mouth Daily.  12   • diltiaZEM (CARDIZEM) 30 MG tablet Take 1 tablet by mouth 2 (Two) Times a Day. 60 tablet 5   • EPIPEN 2-TOD 0.3 MG/0.3ML solution auto-injector injection prn     • ibuprofen (ADVIL,MOTRIN) 800 MG tablet TAKE 1 TABLET BY MOUTH UP TO THREE TIMES A DAY WITH FOOD  5   • montelukast (SINGULAIR) 10 MG tablet Take 1 tablet by mouth every night.     • NORTREL 7/7/7 0.5/0.75/1-35 MG-MCG per tablet Every Night.     • ranitidine (ZANTAC) 150 MG tablet Take 1 tablet by mouth 2 (two) times a day.     • sertraline (ZOLOFT) 100 MG tablet Take 100 mg by mouth Daily.     • VENTOLIN  (90 Base) MCG/ACT inhaler INHALE 2 PUFFS BY MOUTH FOUR TIMES A DAY AS NEEDED FOR COUGH WHEEZE  11   • vitamin E 200 UNIT capsule Take 400 Units by mouth Daily.  0   • [DISCONTINUED] azelastine (ASTELIN) 0.1 % nasal spray 1 spray each nare bid     • [DISCONTINUED] fluticasone (VERAMYST) 27.5 MCG/SPRAY nasal spray 2 sprays into each nostril Daily.       No current  facility-administered medications on file prior to visit.        Azithromycin and Peanuts [peanut oil]    Past Medical History:   Diagnosis Date   • Anxiety    • Depression    • GERD (gastroesophageal reflux disease)    • Palpitations        Social History     Socioeconomic History   • Marital status: Single     Spouse name: Not on file   • Number of children: Not on file   • Years of education: Not on file   • Highest education level: Not on file   Tobacco Use   • Smoking status: Never Smoker   • Smokeless tobacco: Never Used   Substance and Sexual Activity   • Alcohol use: No   • Drug use: No   • Sexual activity: Not Currently     Comment: On the pill to control periods       Family History   Problem Relation Age of Onset   • Diabetes Mother         borderline   • COPD Mother    • Coronary artery disease Mother    • Deep vein thrombosis Mother    • Depression Mother    • Hypertension Mother         essential   • Hyperlipidemia Mother    • Heart disease Mother    • COPD Father    • LACEY disease Father    • Lung cancer Father    • Heart attack Paternal Aunt         Congestive haert failure   • Heart disease Paternal Aunt    • Heart failure Paternal Aunt    • Heart attack Paternal Uncle         Heart  attack   • Heart failure Paternal Uncle    • Heart attack Paternal Uncle         Massive heart attack,    • Heart failure Paternal Uncle    • Heart attack Paternal Grandfather         Massive heart attack,    • Heart disease Paternal Grandfather    • Heart attack Paternal Grandmother         Massive hesrt attack,    • Heart disease Paternal Grandmother    • Heart failure Paternal Uncle         Massive heart attack,        Review of Systems   Constitutional: Positive for fatigue (easily fatigued).   HENT: Negative.  Negative for congestion, rhinorrhea and sore throat.    Eyes: Positive for visual disturbance (supposed to wear glasses but do not).   Respiratory: Negative.  Negative for  "cough, chest tightness and wheezing.    Cardiovascular: Negative.  Negative for chest pain, palpitations and leg swelling.   Gastrointestinal: Negative.  Negative for abdominal pain, nausea and vomiting.   Endocrine: Negative.  Negative for cold intolerance and heat intolerance.   Genitourinary: Negative.  Negative for difficulty urinating, frequency and urgency.   Musculoskeletal: Negative.  Negative for arthralgias, back pain and neck pain.   Skin: Negative.  Negative for rash and wound.   Allergic/Immunologic: Positive for environmental allergies (grass, pollen) and food allergies (tree nut).   Neurological: Positive for dizziness (occasional dizziness). Negative for syncope and light-headedness.   Hematological: Bruises/bleeds easily (bruises easily).   Psychiatric/Behavioral: Positive for agitation (easily agitated). Negative for confusion and sleep disturbance (denies waking up smothering/SOA). The patient is not nervous/anxious.        Objective   Vitals:    01/29/20 1315   BP: 122/75   BP Location: Left arm   Patient Position: Sitting   Pulse: 80   SpO2: 99%   Weight: 99.5 kg (219 lb 6.4 oz)   Height: 170.2 cm (67\")      /75 (BP Location: Left arm, Patient Position: Sitting)   Pulse 80   Ht 170.2 cm (67\")   Wt 99.5 kg (219 lb 6.4 oz)   SpO2 99%   BMI 34.36 kg/m²    Lab Results (most recent)     None        Physical Exam   Constitutional: She is oriented to person, place, and time. She appears well-developed and well-nourished. No distress.   HENT:   Head: Normocephalic and atraumatic.   Eyes: Pupils are equal, round, and reactive to light. Conjunctivae and EOM are normal.   Neck: Normal range of motion. Neck supple. No JVD present. No tracheal deviation present.   Cardiovascular: Normal rate, regular rhythm, normal heart sounds and intact distal pulses.   Pulmonary/Chest: Effort normal and breath sounds normal.   Abdominal: Soft. Bowel sounds are normal. She exhibits no distension and no mass. " There is no tenderness. There is no rebound and no guarding.   Musculoskeletal: Normal range of motion. She exhibits no edema, tenderness or deformity.   Neurological: She is alert and oriented to person, place, and time.   Skin: Skin is warm and dry. No rash noted. No erythema. No pallor.   Psychiatric: She has a normal mood and affect. Her behavior is normal. Judgment and thought content normal.   Nursing note and vitals reviewed.        Procedure     ECG 12 Lead  Date/Time: 1/29/2020 1:26 PM  Performed by: Flakito Patel PA  Authorized by: Flakito Patel PA   Comparison: compared with previous ECG from 5/2/2018  Comparison to previous ECG: Sinus rhythm at 70, normal axis, early precordial R wave progression, no acute changes noted.                 Assessment/Plan      Diagnosis Plan   1. Palpitations  ECG 12 Lead   2. Tachycardia     3. Shortness of breath       1.  Symptoms of palpitations/tachycardia are now minimal on diltiazem therapy.  I will continue that without change.    2.  Her dyspnea is minimal at this time.  She has no specific or significant cardiac symptoms or complications otherwise.    3.  Previous noninvasive work-up is been benign from cardiovascular standpoint.  I do not feel anything further is indicated.  I will continue current medical regimen, in particular diltiazem, without change.  We will continue to see the patient on 6-month intervals.          Patient's Body mass index is 34.36 kg/m². BMI is above normal parameters. Recommendations include: referral to primary care.             Electronically signed by:

## 2020-01-29 NOTE — PATIENT INSTRUCTIONS

## 2020-07-29 ENCOUNTER — OFFICE VISIT (OUTPATIENT)
Dept: CARDIOLOGY | Facility: CLINIC | Age: 25
End: 2020-07-29

## 2020-07-29 VITALS
SYSTOLIC BLOOD PRESSURE: 120 MMHG | HEIGHT: 67 IN | OXYGEN SATURATION: 99 % | BODY MASS INDEX: 36.07 KG/M2 | DIASTOLIC BLOOD PRESSURE: 76 MMHG | WEIGHT: 229.8 LBS | HEART RATE: 71 BPM | TEMPERATURE: 97.5 F

## 2020-07-29 DIAGNOSIS — R06.02 SHORTNESS OF BREATH: ICD-10-CM

## 2020-07-29 DIAGNOSIS — R00.0 TACHYCARDIA: ICD-10-CM

## 2020-07-29 DIAGNOSIS — R00.2 PALPITATIONS: Primary | ICD-10-CM

## 2020-07-29 PROCEDURE — 99213 OFFICE O/P EST LOW 20 MIN: CPT | Performed by: PHYSICIAN ASSISTANT

## 2020-07-29 RX ORDER — FAMOTIDINE 20 MG/1
20 TABLET, FILM COATED ORAL DAILY
COMMUNITY
End: 2022-03-08

## 2020-07-29 NOTE — PATIENT INSTRUCTIONS

## 2020-07-29 NOTE — PROGRESS NOTES
Problem list     Subjective   Essence Law is a 24 y.o. female     Chief Complaint   Patient presents with   • Chest Pain     presents for 6 month f/u   • Palpitations   • Shortness of Breath       HPI  The patient presents in today for routine follow-up.  We have seen her in the past in setting of chest pain, dyspnea, and palpitations.  Previous work-up from cardiovascular standpoint has been benign.  She eventually was placed on diltiazem and has been followed longitudinally.  On diltiazem, palpitations have minimized.  She reports stable dyspnea.  She has had no continued chest pain.  She reports no dizziness or syncope.  Exercise capacity is adequate and without significant limitation from cardiovascular standpoint.  The patient has no further complaints at this time.    Current Outpatient Medications on File Prior to Visit   Medication Sig Dispense Refill   • cetirizine (zyrTEC) 10 MG tablet cetirizine 10 mg tablet   Take 1 tablet every day by oral route.     • Cholecalciferol (VITAMIN D3) 2000 units capsule Take 2,000 Units by mouth Daily.  12   • EPIPEN 2-TOD 0.3 MG/0.3ML solution auto-injector injection prn     • famotidine (PEPCID) 20 MG tablet Take 20 mg by mouth Daily.     • ibuprofen (ADVIL,MOTRIN) 800 MG tablet TAKE 1 TABLET BY MOUTH UP TO THREE TIMES A DAY WITH FOOD  5   • montelukast (SINGULAIR) 10 MG tablet Take 1 tablet by mouth every night.     • NORTREL 7/7/7 0.5/0.75/1-35 MG-MCG per tablet Every Night.     • sertraline (ZOLOFT) 100 MG tablet Take 100 mg by mouth Daily.     • VENTOLIN  (90 Base) MCG/ACT inhaler INHALE 2 PUFFS BY MOUTH FOUR TIMES A DAY AS NEEDED FOR COUGH WHEEZE  11   • vitamin E 200 UNIT capsule Take 400 Units by mouth Daily.  0   • [DISCONTINUED] diltiaZEM (CARDIZEM) 30 MG tablet Take 1 tablet by mouth 2 (Two) Times a Day. 60 tablet 5   • [DISCONTINUED] ranitidine (ZANTAC) 150 MG tablet Take 1 tablet by mouth 2 (two) times a day.       No current facility-administered  medications on file prior to visit.        Peanuts [peanut oil] and Azithromycin    Past Medical History:   Diagnosis Date   • Anxiety    • Arthritis    • Bulging lumbar disc    • Depression    • GERD (gastroesophageal reflux disease)    • Palpitations        Social History     Socioeconomic History   • Marital status: Single     Spouse name: Not on file   • Number of children: Not on file   • Years of education: Not on file   • Highest education level: Not on file   Tobacco Use   • Smoking status: Never Smoker   • Smokeless tobacco: Never Used   Substance and Sexual Activity   • Alcohol use: No   • Drug use: No   • Sexual activity: Not Currently     Comment: On the pill to control periods       Family History   Problem Relation Age of Onset   • Diabetes Mother         borderline   • COPD Mother    • Coronary artery disease Mother    • Deep vein thrombosis Mother    • Depression Mother    • Hypertension Mother         essential   • Hyperlipidemia Mother    • Heart disease Mother    • COPD Father    • LACEY disease Father    • Lung cancer Father    • Heart attack Paternal Aunt         Congestive haert failure   • Heart disease Paternal Aunt    • Heart failure Paternal Aunt    • Heart attack Paternal Uncle         Heart  attack   • Heart failure Paternal Uncle    • Heart attack Paternal Uncle         Massive heart attack,    • Heart failure Paternal Uncle    • Heart attack Paternal Grandfather         Massive heart attack,    • Heart disease Paternal Grandfather    • Heart attack Paternal Grandmother         Massive hesrt attack,    • Heart disease Paternal Grandmother    • Heart failure Paternal Uncle         Massive heart attack,        Review of Systems   Constitutional: Positive for fatigue. Negative for chills, diaphoresis and fever.   HENT: Negative.    Eyes: Negative.  Negative for visual disturbance.   Respiratory: Positive for shortness of breath (on exertion and walking up  "hills). Negative for apnea, cough, chest tightness and wheezing.    Cardiovascular: Positive for chest pain (occas but no more than before) and palpitations (occas racing). Negative for leg swelling.   Gastrointestinal: Positive for constipation. Negative for abdominal pain, blood in stool, diarrhea, nausea and vomiting.   Endocrine: Negative.    Genitourinary: Negative.  Negative for hematuria.   Musculoskeletal: Positive for arthralgias and back pain. Negative for myalgias and neck pain.   Skin: Negative.  Negative for rash and wound.   Allergic/Immunologic: Positive for environmental allergies. Negative for food allergies.   Neurological: Positive for dizziness (vertigo). Negative for syncope, weakness, light-headedness, numbness and headaches.   Hematological: Bruises/bleeds easily.   Psychiatric/Behavioral: Negative.  Negative for agitation and sleep disturbance. The patient is not nervous/anxious.        Objective   Vitals:    07/29/20 1124   BP: 120/76   BP Location: Left arm   Patient Position: Sitting   Pulse: 71   Temp: 97.5 °F (36.4 °C)   SpO2: 99%   Weight: 104 kg (229 lb 12.8 oz)   Height: 170.2 cm (67.01\")      /76 (BP Location: Left arm, Patient Position: Sitting)   Pulse 71   Temp 97.5 °F (36.4 °C)   Ht 170.2 cm (67.01\")   Wt 104 kg (229 lb 12.8 oz)   SpO2 99%   BMI 35.98 kg/m²    Lab Results (most recent)     None        Physical Exam   Constitutional: She is oriented to person, place, and time. She appears well-developed and well-nourished. No distress.   HENT:   Head: Normocephalic and atraumatic.   Eyes: Pupils are equal, round, and reactive to light. Conjunctivae and EOM are normal.   Neck: Normal range of motion. Neck supple. No JVD present. No tracheal deviation present.   Cardiovascular: Normal rate, regular rhythm, normal heart sounds and intact distal pulses.   Pulmonary/Chest: Effort normal and breath sounds normal.   Abdominal: Soft. Bowel sounds are normal. She exhibits no " distension and no mass. There is no tenderness. There is no rebound and no guarding.   Musculoskeletal: Normal range of motion. She exhibits no edema, tenderness or deformity.   Neurological: She is alert and oriented to person, place, and time.   Skin: Skin is warm and dry. No rash noted. No erythema. No pallor.   Psychiatric: She has a normal mood and affect. Her behavior is normal. Judgment and thought content normal.   Nursing note and vitals reviewed.        Procedure   Procedures       Assessment/Plan      Diagnosis Plan   1. Palpitations     2. Tachycardia     3. Shortness of breath       1.  At this time, the patient continues to do well from cardiovascular standpoint.  Palpitations/tachycardic symptoms are well treated with low-dose diltiazem.  She is tolerating that without complication.  We have given her refills for that medication, which have now been sent to her pharmacy.    2.  Otherwise, the patient is doing well.  Her chest pain is now resolved.  Her dyspnea is at baseline.  Previous work-up from cardiovascular standpoint was benign.  I do not feel further work-up is warranted.    3.  We will make no changes.  We will continue to see the patient on 6-month intervals.  She will call for complications prior to follow-up.           Essence CORONADO Law  reports that she has never smoked. She has never used smokeless tobacco.      Patient's Body mass index is 35.98 kg/m². BMI is above normal parameters. Recommendations include: educational material.             Electronically signed by:

## 2021-01-29 ENCOUNTER — OFFICE VISIT (OUTPATIENT)
Dept: CARDIOLOGY | Facility: CLINIC | Age: 26
End: 2021-01-29

## 2021-01-29 VITALS
SYSTOLIC BLOOD PRESSURE: 131 MMHG | BODY MASS INDEX: 36.1 KG/M2 | HEIGHT: 67 IN | DIASTOLIC BLOOD PRESSURE: 86 MMHG | WEIGHT: 230 LBS | HEART RATE: 80 BPM | OXYGEN SATURATION: 99 %

## 2021-01-29 DIAGNOSIS — R00.2 PALPITATIONS: Primary | ICD-10-CM

## 2021-01-29 DIAGNOSIS — I10 ESSENTIAL HYPERTENSION: ICD-10-CM

## 2021-01-29 DIAGNOSIS — R06.02 SHORTNESS OF BREATH: ICD-10-CM

## 2021-01-29 PROCEDURE — 99213 OFFICE O/P EST LOW 20 MIN: CPT | Performed by: PHYSICIAN ASSISTANT

## 2021-01-29 PROCEDURE — 93000 ELECTROCARDIOGRAM COMPLETE: CPT | Performed by: PHYSICIAN ASSISTANT

## 2021-01-29 RX ORDER — OMEPRAZOLE 40 MG/1
40 CAPSULE, DELAYED RELEASE ORAL NIGHTLY
COMMUNITY

## 2021-01-29 RX ORDER — FLUTICASONE PROPIONATE 50 MCG
2 SPRAY, SUSPENSION (ML) NASAL DAILY
COMMUNITY

## 2021-01-29 RX ORDER — DILTIAZEM HYDROCHLORIDE 120 MG/1
120 CAPSULE, COATED, EXTENDED RELEASE ORAL DAILY
Qty: 30 CAPSULE | Refills: 11 | Status: SHIPPED | OUTPATIENT
Start: 2021-01-29 | End: 2021-02-05 | Stop reason: ALTCHOICE

## 2021-01-29 RX ORDER — ONDANSETRON HYDROCHLORIDE 8 MG/1
TABLET, FILM COATED ORAL EVERY 8 HOURS PRN
COMMUNITY
End: 2022-03-08

## 2021-01-29 NOTE — PROGRESS NOTES
Problem list     Subjective   Essence Law is a 25 y.o. female     Chief Complaint   Patient presents with   • Follow-up   • Palpitations       HPI  The patient presents into the clinic today for follow-up.  The patient was initially evaluated because of chest pain, dyspnea, palpitations, and tachycardia.  Previous noninvasive work-up was benign.  She eventually was started on diltiazem at very low dose, 30 mg twice daily, and to address her palpitations and tachycardia at that time.  She really had done well up until the last few months.  She feels that with stress, palpitations and tachycardia have now become a recurrent issue.  She has no sustained dysrhythmic activity.  She has no dizziness or syncope at that time.  She has chest pain only when stressed.  Her dyspnea is stable and at baseline.  She has no further complaints.    Current Outpatient Medications on File Prior to Visit   Medication Sig Dispense Refill   • cetirizine (zyrTEC) 10 MG tablet cetirizine 10 mg tablet   Take 1 tablet every day by oral route.     • Cholecalciferol (VITAMIN D3) 2000 units capsule Take 2,000 Units by mouth Daily.  12   • EPIPEN 2-TOD 0.3 MG/0.3ML solution auto-injector injection prn     • fluticasone (FLONASE) 50 MCG/ACT nasal spray 2 sprays into the nostril(s) as directed by provider Daily.     • ibuprofen (ADVIL,MOTRIN) 800 MG tablet TAKE 1 TABLET BY MOUTH UP TO THREE TIMES A DAY WITH FOOD  5   • montelukast (SINGULAIR) 10 MG tablet Take 1 tablet by mouth every night.     • NORTREL 7/7/7 0.5/0.75/1-35 MG-MCG per tablet Every Night.     • omeprazole (priLOSEC) 40 MG capsule Take 40 mg by mouth Every Night.     • ondansetron (ZOFRAN) 8 MG tablet Take  by mouth Every 8 (Eight) Hours As Needed for Nausea or Vomiting.     • sertraline (ZOLOFT) 100 MG tablet Take 100 mg by mouth Daily.     • VENTOLIN  (90 Base) MCG/ACT inhaler INHALE 2 PUFFS BY MOUTH FOUR TIMES A DAY AS NEEDED FOR COUGH WHEEZE  11   • vitamin E 200 UNIT  capsule Take 400 Units by mouth Daily.  0   • [DISCONTINUED] dilTIAZem (Cardizem) 30 MG tablet Take 1 tablet by mouth 2 (two) times a day. 180 tablet 3   • famotidine (PEPCID) 20 MG tablet Take 20 mg by mouth Daily.       No current facility-administered medications on file prior to visit.        Peanuts [peanut oil] and Azithromycin    Past Medical History:   Diagnosis Date   • Anxiety    • Arthritis    • Bulging lumbar disc    • COVID-19    • Depression    • GERD (gastroesophageal reflux disease)    • Palpitations        Social History     Socioeconomic History   • Marital status: Single     Spouse name: Not on file   • Number of children: Not on file   • Years of education: Not on file   • Highest education level: Not on file   Tobacco Use   • Smoking status: Never Smoker   • Smokeless tobacco: Never Used   Substance and Sexual Activity   • Alcohol use: No   • Drug use: No   • Sexual activity: Not Currently     Comment: On the pill to control periods       Family History   Problem Relation Age of Onset   • Diabetes Mother         borderline   • COPD Mother    • Coronary artery disease Mother    • Deep vein thrombosis Mother    • Depression Mother    • Hypertension Mother         essential   • Hyperlipidemia Mother    • Heart disease Mother    • COPD Father    • LACEY disease Father    • Lung cancer Father    • Heart attack Paternal Aunt         Congestive haert failure   • Heart disease Paternal Aunt    • Heart failure Paternal Aunt    • Heart attack Paternal Uncle         Heart  attack   • Heart failure Paternal Uncle    • Heart attack Paternal Uncle         Massive heart attack,    • Heart failure Paternal Uncle    • Heart attack Paternal Grandfather         Massive heart attack,    • Heart disease Paternal Grandfather    • Heart attack Paternal Grandmother         Massive hesrt attack,    • Heart disease Paternal Grandmother    • Heart failure Paternal Uncle         Massive heart attack,  "       Review of Systems   Constitutional: Negative for chills, fatigue and fever.   HENT: Negative for congestion, rhinorrhea and sore throat.    Eyes: Positive for visual disturbance.   Respiratory: Negative.  Negative for apnea, chest tightness and shortness of breath.    Cardiovascular: Positive for palpitations. Negative for chest pain (during COVID) and leg swelling.   Gastrointestinal: Negative.    Endocrine: Negative.    Genitourinary: Negative.    Musculoskeletal: Positive for back pain. Negative for arthralgias, gait problem, neck pain and neck stiffness.   Skin: Negative.  Negative for rash and wound.   Allergic/Immunologic: Positive for environmental allergies (seasonal allergies) and food allergies (peanuts).   Neurological: Positive for dizziness, weakness, light-headedness, numbness (occasionally in hands) and headaches.   Hematological: Bruises/bleeds easily (in legs).   Psychiatric/Behavioral: Negative for sleep disturbance.       Objective   Vitals:    21 1017   BP: 131/86   BP Location: Left arm   Patient Position: Sitting   Pulse: 80   SpO2: 99%   Weight: 104 kg (230 lb)   Height: 170.2 cm (67.01\")      /86 (BP Location: Left arm, Patient Position: Sitting)   Pulse 80   Ht 170.2 cm (67.01\")   Wt 104 kg (230 lb)   SpO2 99%   BMI 36.01 kg/m²    Lab Results (most recent)     None        Physical Exam  Vitals signs and nursing note reviewed.   Constitutional:       General: She is not in acute distress.     Appearance: She is well-developed.   HENT:      Head: Normocephalic and atraumatic.   Eyes:      Conjunctiva/sclera: Conjunctivae normal.      Pupils: Pupils are equal, round, and reactive to light.   Neck:      Musculoskeletal: Normal range of motion and neck supple.      Vascular: No JVD.      Trachea: No tracheal deviation.   Cardiovascular:      Rate and Rhythm: Normal rate and regular rhythm.      Heart sounds: Normal heart sounds.   Pulmonary:      Effort: Pulmonary " effort is normal.      Breath sounds: Normal breath sounds.   Abdominal:      General: Bowel sounds are normal. There is no distension.      Palpations: Abdomen is soft. There is no mass.      Tenderness: There is no abdominal tenderness. There is no guarding or rebound.   Musculoskeletal: Normal range of motion.         General: No tenderness or deformity.   Skin:     General: Skin is warm and dry.      Coloration: Skin is not pale.      Findings: No erythema or rash.   Neurological:      Mental Status: She is alert and oriented to person, place, and time.   Psychiatric:         Behavior: Behavior normal.         Thought Content: Thought content normal.         Judgment: Judgment normal.           Procedure     ECG 12 Lead    Date/Time: 1/29/2021 10:25 AM  Performed by: Flakito Patel PA  Authorized by: Flakito Patel PA   Comparison: compared with previous ECG from 1/29/2020  Comparison to previous ECG: Sinus rhythm at 76, normal axis, no acute changes noted.                 Assessment/Plan      Diagnosis Plan   1. Palpitations  ECG 12 Lead   2. Shortness of breath     3. Essential hypertension       1.  The patient reports increasing tachycardia and palpitations otherwise.  As evidenced by home blood pressure monitoring, she has had ongoing hypertensive issues as well.    2.  In that setting, I would discontinue diltiazem at 30 mg twice daily and change that to Cardizem 120 mg 1 tab daily.  She will monitor heart rate and blood pressures closely at home.    3.  If symptoms do not improve, she will call back and we would consider evaluation via echocardiogram and event monitor at that time.    4.  If the symptoms do improve on Cardizem 120 mg 1 tab daily, we will continue to see her on 6-month intervals at that time.            Essence E Law  reports that she has never smoked. She has never used smokeless tobacco.           Patient's Body mass index is 36.01 kg/m². BMI is above normal parameters.  Recommendations include: educational material.       Electronically signed by:

## 2021-02-05 RX ORDER — DILTIAZEM HYDROCHLORIDE 60 MG/1
60 CAPSULE, EXTENDED RELEASE ORAL 2 TIMES DAILY
Qty: 60 CAPSULE | Refills: 11 | Status: SHIPPED | OUTPATIENT
Start: 2021-02-05 | End: 2022-02-15

## 2021-02-05 NOTE — TELEPHONE ENCOUNTER
Per Flakito, change rx to Diltiazem 60mg BID.       Called and informed pt of the above, she verbalized understanding.

## 2021-02-05 NOTE — TELEPHONE ENCOUNTER
"Pt LVM stating that Flakito increased her meds and that they're giving her \"weird side effects.\" She requested a call back.   #[phone]      Per chart review, OV note from 1/29/21 states:  \"In that setting, I would discontinue diltiazem at 30 mg twice daily and change that to Cardizem 120 mg 1 tab daily.  She will monitor heart rate and blood pressures closely at home.\" As pt had c/o worsening palps and tachycardia.         Called pt, I requested more info on the side effects she's having. She stated that she's \"really really really tired, to the point of being high or something.\" Stated yesterday she was dizzy and had some CP. Went to [facility] yesterday and they weren't able to figure out thew cause, said she had no clots. I asked about VS at home, she stated she doesn't check them at home, but HR is usually . Pt stated she's only had these issues since rx change. Confirmed that she stopped the Diltiazem and started Cardiazem 120 once/day, denies any other med changes since we saw her last.   Informed pt that Flakito is w/ pt currently, but that I will send a message back and call w/ response. Pt stated to call the same number, okay to LVM.       "

## 2021-04-28 ENCOUNTER — OFFICE VISIT (OUTPATIENT)
Dept: CARDIOLOGY | Facility: CLINIC | Age: 26
End: 2021-04-28

## 2021-04-28 VITALS
HEIGHT: 67 IN | HEART RATE: 87 BPM | SYSTOLIC BLOOD PRESSURE: 138 MMHG | BODY MASS INDEX: 36.6 KG/M2 | DIASTOLIC BLOOD PRESSURE: 85 MMHG | WEIGHT: 233.2 LBS

## 2021-04-28 DIAGNOSIS — R07.89 CHEST PAIN, ATYPICAL: ICD-10-CM

## 2021-04-28 DIAGNOSIS — R00.2 PALPITATIONS: ICD-10-CM

## 2021-04-28 DIAGNOSIS — R06.02 SHORTNESS OF BREATH: Primary | ICD-10-CM

## 2021-04-28 PROCEDURE — 99214 OFFICE O/P EST MOD 30 MIN: CPT | Performed by: PHYSICIAN ASSISTANT

## 2021-04-28 NOTE — PROGRESS NOTES
Problem list     Subjective   Essence Law is a 25 y.o. female     Chief Complaint   Patient presents with   • Follow-up       HPI    Patient is a 25-year-old female that presents to the office for evaluation.  Patient has been seen for many years through the office.  Patient has had testing in the past including an echocardiogram in 2016 as well as an event monitor with no significant arrhythmias.    She was diagnosed with coronavirus apparently in December and since that time is felt more palpitations.  She has noticed a fluttering sensation despite the transition to diltiazem.  Patient describes having chest pain that occurs intermittently on the left side of her chest.  She will notice a sharp pain last for a few seconds and then resolves.  Her dyspnea is mild and all of the symptoms seem to have worsened since her diagnosis in December.  She does not describe PND, orthopnea or edema    She does not describe any dizziness, presyncope or syncope but her symptoms have been concerning as she presents today for further evaluation      Current Outpatient Medications on File Prior to Visit   Medication Sig Dispense Refill   • cetirizine (zyrTEC) 10 MG tablet cetirizine 10 mg tablet   Take 1 tablet every day by oral route.     • Cholecalciferol (VITAMIN D3) 2000 units capsule Take 2,000 Units by mouth Daily.  12   • dilTIAZem SR (CARDIZEM SR) 60 MG 12 hr capsule Take 1 capsule by mouth 2 (Two) Times a Day. 60 capsule 11   • EPIPEN 2-TOD 0.3 MG/0.3ML solution auto-injector injection prn     • famotidine (PEPCID) 20 MG tablet Take 20 mg by mouth Daily.     • fluticasone (FLONASE) 50 MCG/ACT nasal spray 2 sprays into the nostril(s) as directed by provider Daily.     • ibuprofen (ADVIL,MOTRIN) 800 MG tablet TAKE 1 TABLET BY MOUTH UP TO THREE TIMES A DAY WITH FOOD  5   • montelukast (SINGULAIR) 10 MG tablet Take 1 tablet by mouth every night.     • NORTREL 7/7/7 0.5/0.75/1-35 MG-MCG per tablet Every Night.     •  omeprazole (priLOSEC) 40 MG capsule Take 40 mg by mouth Every Night.     • ondansetron (ZOFRAN) 8 MG tablet Take  by mouth Every 8 (Eight) Hours As Needed for Nausea or Vomiting.     • sertraline (ZOLOFT) 100 MG tablet Take 100 mg by mouth Daily.     • VENTOLIN  (90 Base) MCG/ACT inhaler INHALE 2 PUFFS BY MOUTH FOUR TIMES A DAY AS NEEDED FOR COUGH WHEEZE  11   • vitamin E 200 UNIT capsule Take 400 Units by mouth Daily.  0     No current facility-administered medications on file prior to visit.       Peanuts [peanut oil] and Azithromycin    Past Medical History:   Diagnosis Date   • Anxiety    • Arthritis    • Bulging lumbar disc    • COVID-19    • Depression    • GERD (gastroesophageal reflux disease)    • Palpitations        Social History     Socioeconomic History   • Marital status: Single     Spouse name: Not on file   • Number of children: Not on file   • Years of education: Not on file   • Highest education level: Not on file   Tobacco Use   • Smoking status: Never Smoker   • Smokeless tobacco: Never Used   Substance and Sexual Activity   • Alcohol use: No   • Drug use: No   • Sexual activity: Not Currently     Comment: On the pill to control periods       Family History   Problem Relation Age of Onset   • Diabetes Mother         borderline   • COPD Mother    • Coronary artery disease Mother    • Deep vein thrombosis Mother    • Depression Mother    • Hypertension Mother         essential   • Hyperlipidemia Mother    • Heart disease Mother    • COPD Father    • LACEY disease Father    • Lung cancer Father    • Heart attack Paternal Aunt         Congestive haert failure   • Heart disease Paternal Aunt    • Heart failure Paternal Aunt    • Heart attack Paternal Uncle         Heart  attack   • Heart failure Paternal Uncle    • Heart attack Paternal Uncle         Massive heart attack,    • Heart failure Paternal Uncle    • Heart attack Paternal Grandfather         Massive heart attack,    •  "Heart disease Paternal Grandfather    • Heart attack Paternal Grandmother         Massive hesrt attack,    • Heart disease Paternal Grandmother    • Heart failure Paternal Uncle         Massive heart attack,        Review of Systems   Constitutional: Negative.  Negative for chills, fatigue and fever.   HENT: Negative for congestion, rhinorrhea and sore throat.    Respiratory: Positive for chest tightness and shortness of breath.    Cardiovascular: Positive for chest pain and palpitations. Negative for leg swelling.   Gastrointestinal: Negative.    Endocrine: Negative.    Genitourinary: Negative.    Musculoskeletal: Negative.    Skin: Negative.  Negative for rash and wound.   Allergic/Immunologic: Positive for food allergies (peanut).   Neurological: Positive for dizziness, syncope (near syncope) and light-headedness. Negative for numbness and headaches.   Hematological: Bruises/bleeds easily (legs).   Psychiatric/Behavioral: Negative.  Negative for sleep disturbance.       Objective   Vitals:    21 0929   BP: 138/85   BP Location: Left arm   Patient Position: Sitting   Pulse: 87   Weight: 106 kg (233 lb 3.2 oz)   Height: 170.2 cm (67.01\")      /85 (BP Location: Left arm, Patient Position: Sitting)   Pulse 87   Ht 170.2 cm (67.01\")   Wt 106 kg (233 lb 3.2 oz)   BMI 36.52 kg/m²     Lab Results (most recent)     None          Physical Exam  Vitals and nursing note reviewed.   Constitutional:       General: She is not in acute distress.     Appearance: Normal appearance. She is well-developed.   HENT:      Head: Normocephalic and atraumatic.   Eyes:      General: No scleral icterus.        Right eye: No discharge.         Left eye: No discharge.      Conjunctiva/sclera: Conjunctivae normal.   Neck:      Vascular: No carotid bruit.   Cardiovascular:      Rate and Rhythm: Normal rate and regular rhythm.      Heart sounds: Normal heart sounds. No murmur heard.   No friction rub. No gallop.   "   Pulmonary:      Effort: Pulmonary effort is normal. No respiratory distress.      Breath sounds: Normal breath sounds. No wheezing or rales.   Chest:      Chest wall: No tenderness.   Musculoskeletal:      Right lower leg: No edema.      Left lower leg: No edema.   Skin:     General: Skin is warm and dry.      Coloration: Skin is not pale.      Findings: No erythema or rash.   Neurological:      Mental Status: She is alert and oriented to person, place, and time.      Cranial Nerves: No cranial nerve deficit.   Psychiatric:         Behavior: Behavior normal.         Procedure   Procedures       Assessment/Plan     Problems Addressed this Visit        Cardiac and Vasculature    Palpitations    Relevant Orders    Adult Transthoracic Echo Complete W/ Cont if Necessary Per Protocol    Cardiac Event Monitor    Treadmill Stress Test       Pulmonary and Pneumonias    Shortness of breath - Primary    Relevant Orders    Adult Transthoracic Echo Complete W/ Cont if Necessary Per Protocol    Cardiac Event Monitor    Treadmill Stress Test       Symptoms and Signs    Chest pain, atypical    Relevant Orders    Adult Transthoracic Echo Complete W/ Cont if Necessary Per Protocol    Cardiac Event Monitor    Treadmill Stress Test      Diagnoses       Codes Comments    Shortness of breath    -  Primary ICD-10-CM: R06.02  ICD-9-CM: 786.05     Palpitations     ICD-10-CM: R00.2  ICD-9-CM: 785.1     Chest pain, atypical     ICD-10-CM: R07.89  ICD-9-CM: 786.59             Recommendation  1.  Patient with atypical chest discomfort but symptoms of dyspnea palpitations occurring since the diagnosis of coronavirus.  She would like repeat testing and will schedule testing.  Regular treadmill stress test will be ordered    2.  Patient has ST abnormality on EKG with abnormal EKG palpitations dyspnea chest pain, would like to schedule echocardiogram to reevaluate    3.  Event monitor to rule out any arrhythmic substrate based on her  symptoms    4.  We will see her back for follow-up on the above testing.  She is to follow with primary as scheduled         Essence Law  reports that she has never smoked. She has never used smokeless tobacco.        Patient's Body mass index is 36.52 kg/m². BMI is above normal parameters. Recommendations include: educational material.       Electronically signed by:

## 2021-04-28 NOTE — PATIENT INSTRUCTIONS
Heart-Healthy Eating Plan  Heart-healthy meal planning includes:  · Eating less unhealthy fats.  · Eating more healthy fats.  · Making other changes in your diet.  Talk with your doctor or a diet specialist (dietitian) to create an eating plan that is right for you.  What is my plan?  Your doctor may recommend an eating plan that includes:  · Total fat: ______% or less of total calories a day.  · Saturated fat: ______% or less of total calories a day.  · Cholesterol: less than _________mg a day.  What are tips for following this plan?  Cooking  Avoid frying your food. Try to bake, boil, grill, or broil it instead. You can also reduce fat by:  · Removing the skin from poultry.  · Removing all visible fats from meats.  · Steaming vegetables in water or broth.  Meal planning    · At meals, divide your plate into four equal parts:  ? Fill one-half of your plate with vegetables and green salads.  ? Fill one-fourth of your plate with whole grains.  ? Fill one-fourth of your plate with lean protein foods.  · Eat 4-5 servings of vegetables per day. A serving of vegetables is:  ? 1 cup of raw or cooked vegetables.  ? 2 cups of raw leafy greens.  · Eat 4-5 servings of fruit per day. A serving of fruit is:  ? 1 medium whole fruit.  ? ¼ cup of dried fruit.  ? ½ cup of fresh, frozen, or canned fruit.  ? ½ cup of 100% fruit juice.  · Eat more foods that have soluble fiber. These are apples, broccoli, carrots, beans, peas, and barley. Try to get 20-30 g of fiber per day.  · Eat 4-5 servings of nuts, legumes, and seeds per week:  ? 1 serving of dried beans or legumes equals ½ cup after being cooked.  ? 1 serving of nuts is ¼ cup.  ? 1 serving of seeds equals 1 tablespoon.  General information  · Eat more home-cooked food. Eat less restaurant, buffet, and fast food.  · Limit or avoid alcohol.  · Limit foods that are high in starch and sugar.  · Avoid fried foods.  · Lose weight if you are overweight.  · Keep track of how much salt  (sodium) you eat. This is important if you have high blood pressure. Ask your doctor to tell you more about this.  · Try to add vegetarian meals each week.  Fats  · Choose healthy fats. These include olive oil and canola oil, flaxseeds, walnuts, almonds, and seeds.  · Eat more omega-3 fats. These include salmon, mackerel, sardines, tuna, flaxseed oil, and ground flaxseeds. Try to eat fish at least 2 times each week.  · Check food labels. Avoid foods with trans fats or high amounts of saturated fat.  · Limit saturated fats.  ? These are often found in animal products, such as meats, butter, and cream.  ? These are also found in plant foods, such as palm oil, palm kernel oil, and coconut oil.  · Avoid foods with partially hydrogenated oils in them. These have trans fats. Examples are stick margarine, some tub margarines, cookies, crackers, and other baked goods.  What foods can I eat?  Fruits  All fresh, canned (in natural juice), or frozen fruits.  Vegetables  Fresh or frozen vegetables (raw, steamed, roasted, or grilled). Green salads.  Grains  Most grains. Choose whole wheat and whole grains most of the time. Rice and pasta, including brown rice and pastas made with whole wheat.  Meats and other proteins  Lean, well-trimmed beef, veal, pork, and lamb. Chicken and turkey without skin. All fish and shellfish. Wild duck, rabbit, pheasant, and venison. Egg whites or low-cholesterol egg substitutes. Dried beans, peas, lentils, and tofu. Seeds and most nuts.  Dairy  Low-fat or nonfat cheeses, including ricotta and mozzarella. Skim or 1% milk that is liquid, powdered, or evaporated. Buttermilk that is made with low-fat milk. Nonfat or low-fat yogurt.  Fats and oils  Non-hydrogenated (trans-free) margarines. Vegetable oils, including soybean, sesame, sunflower, olive, peanut, safflower, corn, canola, and cottonseed. Salad dressings or mayonnaise made with a vegetable oil.  Beverages  Mineral water. Coffee and tea. Diet  carbonated beverages.  Sweets and desserts  Sherbet, gelatin, and fruit ice. Small amounts of dark chocolate.  Limit all sweets and desserts.  Seasonings and condiments  All seasonings and condiments.  The items listed above may not be a complete list of foods and drinks you can eat. Contact a dietitian for more options.  What foods should I avoid?  Fruits  Canned fruit in heavy syrup. Fruit in cream or butter sauce. Fried fruit. Limit coconut.  Vegetables  Vegetables cooked in cheese, cream, or butter sauce. Fried vegetables.  Grains  Breads that are made with saturated or trans fats, oils, or whole milk. Croissants. Sweet rolls. Donuts. High-fat crackers, such as cheese crackers.  Meats and other proteins  Fatty meats, such as hot dogs, ribs, sausage, lima, rib-eye roast or steak. High-fat deli meats, such as salami and bologna. Caviar. Domestic duck and goose. Organ meats, such as liver.  Dairy  Cream, sour cream, cream cheese, and creamed cottage cheese. Whole-milk cheeses. Whole or 2% milk that is liquid, evaporated, or condensed. Whole buttermilk. Cream sauce or high-fat cheese sauce. Yogurt that is made from whole milk.  Fats and oils  Meat fat, or shortening. Cocoa butter, hydrogenated oils, palm oil, coconut oil, palm kernel oil. Solid fats and shortenings, including lima fat, salt pork, lard, and butter. Nondairy cream substitutes. Salad dressings with cheese or sour cream.  Beverages  Regular sodas and juice drinks with added sugar.  Sweets and desserts  Frosting. Pudding. Cookies. Cakes. Pies. Milk chocolate or white chocolate. Buttered syrups. Full-fat ice cream or ice cream drinks.  The items listed above may not be a complete list of foods and drinks to avoid. Contact a dietitian for more information.  Summary  · Heart-healthy meal planning includes eating less unhealthy fats, eating more healthy fats, and making other changes in your diet.  · Eat a balanced diet. This includes fruits and  vegetables, low-fat or nonfat dairy, lean protein, nuts and legumes, whole grains, and heart-healthy oils and fats.  This information is not intended to replace advice given to you by your health care provider. Make sure you discuss any questions you have with your health care provider.  Document Revised: 02/21/2019 Document Reviewed: 01/25/2019  [employer] Patient Education © 2021 [employer]

## 2021-07-01 ENCOUNTER — HOSPITAL ENCOUNTER (OUTPATIENT)
Dept: CARDIOLOGY | Facility: HOSPITAL | Age: 26
Discharge: HOME | End: 2021-07-01

## 2021-07-01 DIAGNOSIS — R07.89 CHEST PAIN, ATYPICAL: ICD-10-CM

## 2021-07-01 DIAGNOSIS — R00.2 PALPITATIONS: ICD-10-CM

## 2021-07-01 DIAGNOSIS — R06.02 SHORTNESS OF BREATH: ICD-10-CM

## 2021-07-01 PROCEDURE — 93306 TTE W/DOPPLER COMPLETE: CPT | Performed by: INTERNAL MEDICINE

## 2021-07-01 PROCEDURE — 93017 CV STRESS TEST TRACING ONLY: CPT

## 2021-07-01 PROCEDURE — 93306 TTE W/DOPPLER COMPLETE: CPT

## 2021-07-01 PROCEDURE — 93018 CV STRESS TEST I&R ONLY: CPT | Performed by: INTERNAL MEDICINE

## 2021-07-03 LAB
BH CV STRESS RECOVERY BP: NORMAL MMHG
BH CV STRESS RECOVERY HR: 97 BPM
MAXIMAL PREDICTED HEART RATE: 197 BPM
PERCENT MAX PREDICTED HR: 87.82 %
STRESS BASELINE BP: NORMAL MMHG
STRESS BASELINE HR: 86 BPM
STRESS PERCENT HR: 103 %
STRESS POST ESTIMATED WORKLOAD: 10.1 METS
STRESS POST EXERCISE DUR MIN: 7 MIN
STRESS POST EXERCISE DUR SEC: 0 SEC
STRESS POST PEAK BP: NORMAL MMHG
STRESS POST PEAK HR: 173 BPM
STRESS TARGET HR: 167 BPM

## 2021-07-05 LAB
BH CV ECHO MEAS - AO MAX PG (FULL): 1.8 MMHG
BH CV ECHO MEAS - AO MAX PG: 5.1 MMHG
BH CV ECHO MEAS - AO MEAN PG (FULL): 0.95 MMHG
BH CV ECHO MEAS - AO MEAN PG: 2.9 MMHG
BH CV ECHO MEAS - AO ROOT AREA (BSA CORRECTED): 1.3
BH CV ECHO MEAS - AO ROOT AREA: 5.8 CM^2
BH CV ECHO MEAS - AO ROOT DIAM: 2.7 CM
BH CV ECHO MEAS - AO V2 MAX: 113 CM/SEC
BH CV ECHO MEAS - AO V2 MEAN: 82.4 CM/SEC
BH CV ECHO MEAS - AO V2 VTI: 23.7 CM
BH CV ECHO MEAS - AVA(I,A): 2.6 CM^2
BH CV ECHO MEAS - AVA(I,D): 2.6 CM^2
BH CV ECHO MEAS - AVA(V,A): 2.6 CM^2
BH CV ECHO MEAS - AVA(V,D): 2.6 CM^2
BH CV ECHO MEAS - BSA(HAYCOCK): 2.3 M^2
BH CV ECHO MEAS - BSA: 2.2 M^2
BH CV ECHO MEAS - BZI_BMI: 36.7 KILOGRAMS/M^2
BH CV ECHO MEAS - BZI_METRIC_HEIGHT: 170 CM
BH CV ECHO MEAS - BZI_METRIC_WEIGHT: 106 KG
BH CV ECHO MEAS - EDV(CUBED): 88.3 ML
BH CV ECHO MEAS - EDV(MOD-SP2): 65 ML
BH CV ECHO MEAS - EDV(MOD-SP4): 82.9 ML
BH CV ECHO MEAS - EDV(TEICH): 90.2 ML
BH CV ECHO MEAS - EF(CUBED): 60.6 %
BH CV ECHO MEAS - EF(TEICH): 52.4 %
BH CV ECHO MEAS - ESV(CUBED): 34.8 ML
BH CV ECHO MEAS - ESV(TEICH): 43 ML
BH CV ECHO MEAS - FS: 26.7 %
BH CV ECHO MEAS - IVS/LVPW: 0.98
BH CV ECHO MEAS - IVSD: 0.87 CM
BH CV ECHO MEAS - LA DIMENSION: 3.3 CM
BH CV ECHO MEAS - LA/AO: 1.2
BH CV ECHO MEAS - LV DIASTOLIC VOL/BSA (35-75): 38.4 ML/M^2
BH CV ECHO MEAS - LV IVRT: 0.09 SEC
BH CV ECHO MEAS - LV MASS(C)D: 125.8 GRAMS
BH CV ECHO MEAS - LV MASS(C)DI: 58.3 GRAMS/M^2
BH CV ECHO MEAS - LV MAX PG: 3.3 MMHG
BH CV ECHO MEAS - LV MEAN PG: 1.9 MMHG
BH CV ECHO MEAS - LV V1 MAX: 90.6 CM/SEC
BH CV ECHO MEAS - LV V1 MEAN: 66.9 CM/SEC
BH CV ECHO MEAS - LV V1 VTI: 19 CM
BH CV ECHO MEAS - LVIDD: 4.5 CM
BH CV ECHO MEAS - LVIDS: 3.3 CM
BH CV ECHO MEAS - LVOT AREA (M): 3.3 CM^2
BH CV ECHO MEAS - LVOT AREA: 3.3 CM^2
BH CV ECHO MEAS - LVOT DIAM: 2 CM
BH CV ECHO MEAS - LVPWD: 0.88 CM
BH CV ECHO MEAS - MR MAX PG: 17.1 MMHG
BH CV ECHO MEAS - MR MAX VEL: 206.7 CM/SEC
BH CV ECHO MEAS - MV A MAX VEL: 45 CM/SEC
BH CV ECHO MEAS - MV DEC TIME: 0.18 SEC
BH CV ECHO MEAS - MV E MAX VEL: 81 CM/SEC
BH CV ECHO MEAS - MV E/A: 1.8
BH CV ECHO MEAS - MV MAX PG: 2.6 MMHG
BH CV ECHO MEAS - MV MEAN PG: 0.63 MMHG
BH CV ECHO MEAS - MV V2 MAX: 80.4 CM/SEC
BH CV ECHO MEAS - MV V2 MEAN: 34.5 CM/SEC
BH CV ECHO MEAS - MV V2 VTI: 19.1 CM
BH CV ECHO MEAS - MVA(VTI): 3.2 CM^2
BH CV ECHO MEAS - PA ACC TIME: 0.13 SEC
BH CV ECHO MEAS - PA MAX PG: 6.2 MMHG
BH CV ECHO MEAS - PA MEAN PG: 3.7 MMHG
BH CV ECHO MEAS - PA PR(ACCEL): 20.3 MMHG
BH CV ECHO MEAS - PA V2 MAX: 124 CM/SEC
BH CV ECHO MEAS - PA V2 MEAN: 93.5 CM/SEC
BH CV ECHO MEAS - PA V2 VTI: 24.8 CM
BH CV ECHO MEAS - PULM A REVS DUR: 0.11 SEC
BH CV ECHO MEAS - PULM A REVS VEL: 23 CM/SEC
BH CV ECHO MEAS - PULM DIAS VEL: 63 CM/SEC
BH CV ECHO MEAS - PULM S/D: 0.93
BH CV ECHO MEAS - PULM SYS VEL: 58.5 CM/SEC
BH CV ECHO MEAS - RAP SYSTOLE: 10 MMHG
BH CV ECHO MEAS - RVDD: 2.8 CM
BH CV ECHO MEAS - RVSP: 26 MMHG
BH CV ECHO MEAS - SI(AO): 63.9 ML/M^2
BH CV ECHO MEAS - SI(CUBED): 24.8 ML/M^2
BH CV ECHO MEAS - SI(LVOT): 28.7 ML/M^2
BH CV ECHO MEAS - SI(TEICH): 21.9 ML/M^2
BH CV ECHO MEAS - SV(AO): 137.8 ML
BH CV ECHO MEAS - SV(CUBED): 53.6 ML
BH CV ECHO MEAS - SV(LVOT): 61.9 ML
BH CV ECHO MEAS - SV(TEICH): 47.3 ML
BH CV ECHO MEAS - TR MAX VEL: 200.2 CM/SEC
MAXIMAL PREDICTED HEART RATE: 197 BPM
STRESS TARGET HR: 167 BPM

## 2021-07-07 ENCOUNTER — TELEPHONE (OUTPATIENT)
Dept: CARDIOLOGY | Facility: CLINIC | Age: 26
End: 2021-07-07

## 2021-07-07 NOTE — TELEPHONE ENCOUNTER
Patient informed of stress and echo results. Patient verbalized understanding. [place] Shaffer LPN        ----- Message from ROHAN Smith sent at 7/6/2021  2:11 PM EDT -----  Routine follow-up  Adult Transthoracic Echo Complete W/ Cont if Necessary Per Protocol  Order: [id number]  Status:  Final result   Visible to patient:  Yes (MyChart) Dx:  Palpitations; Shortness of breath; Ch...  Details    Reading Physician Reading Date Result Priority   Sergey Jimenez MD  [phone] 7/5/2021 Routine      Result Text  1.  LV size, function, wall motion, and wall thickness are normal.  Visually estimated ejection fraction is 55 to 60%.  Normal LV diastolic function and filling pressures.  The atria and right ventricle are normal.  No septal defect or intracavitary mass or thrombus.     2.  Valves are morphologically normal with no stenotic lesions or valve associated masses or thrombi.  There is trivial to mild MR, trivial AI, and trivial TR.     3.  No pericardial or great vessel pathology.     4.  Pulmonary artery systolic pressures are estimated in the low to mid 20s        Herrera Fregoso PA OlmsR[place] ludwig LPN  Routine follow-up           Treadmill Stress Test  Order: [id number]  Status:  Final result   Visible to patient:  Yes (MyChart) Dx:  Palpitations; Shortness of breath; Ch...  Details    Reading Physician Reading Date Result Priority   Sergey Jimenez MD  [phone] 7/1/2021 Routine      Result Text  1.  Adequate but somewhat less than expected functional capacity without symptoms.  The patient exercised 7 minutes on a Santi protocol to a heart rate of 173, systolic blood pressure of 183, rate-pressure product of greater than 31,000, and an O2 consumption of 10.1 METS.  The patient achieved 88% of age adjusted maximum predicted heart rate.     2.  Physiologic heart rate and blood pressure response to exercise.     3.  No EKG evidence of ischemia.     4.  No exercise-induced dysrhythmia.

## 2021-07-29 ENCOUNTER — OFFICE VISIT (OUTPATIENT)
Dept: CARDIOLOGY | Facility: CLINIC | Age: 26
End: 2021-07-29

## 2021-07-29 VITALS
HEART RATE: 72 BPM | DIASTOLIC BLOOD PRESSURE: 63 MMHG | OXYGEN SATURATION: 99 % | HEIGHT: 67 IN | SYSTOLIC BLOOD PRESSURE: 110 MMHG | WEIGHT: 223.4 LBS | BODY MASS INDEX: 35.06 KG/M2

## 2021-07-29 DIAGNOSIS — R07.89 CHEST PAIN, ATYPICAL: Primary | ICD-10-CM

## 2021-07-29 DIAGNOSIS — R00.2 PALPITATIONS: ICD-10-CM

## 2021-07-29 DIAGNOSIS — R06.02 SHORTNESS OF BREATH: ICD-10-CM

## 2021-07-29 PROCEDURE — 99213 OFFICE O/P EST LOW 20 MIN: CPT | Performed by: PHYSICIAN ASSISTANT

## 2021-07-29 NOTE — PATIENT INSTRUCTIONS

## 2021-07-29 NOTE — PROGRESS NOTES
Problem list     Subjective   Essence Law is a 25 y.o. female     Chief Complaint   Patient presents with   • Follow-up     testing        HPI  The patient presents back today for follow-up of stress test, echo, and monitor findings.  We had followed this patient historically because of chest pain and palpitations.  Evaluation at this time had been benign historically.  We have placed her on diltiazem for suppression of palpitations.  She reports that on current dosing of diltiazem, which was increased after last evaluation, that her palpitations have now minimized.  She reported increasing palpitations and chest discomfort however at last evaluation.  She was scheduled for testing.  Regular treadmill stress test indicated no evidence of ischemia.  Echo indicated preserved systolic function with no significant valvular issues.  Her monitor indicated sinus rhythm without significant dysrhythmic activity.  She tells me that she changed her place of work and feels markedly improved.  She feels that the majority of her symptoms were directly related to stress/anxiety.    Current Outpatient Medications on File Prior to Visit   Medication Sig Dispense Refill   • cetirizine (zyrTEC) 10 MG tablet cetirizine 10 mg tablet   Take 1 tablet every day by oral route.     • Cholecalciferol (VITAMIN D3) 2000 units capsule Take 2,000 Units by mouth Daily.  12   • dilTIAZem SR (CARDIZEM SR) 60 MG 12 hr capsule Take 1 capsule by mouth 2 (Two) Times a Day. 60 capsule 11   • EPIPEN 2-TOD 0.3 MG/0.3ML solution auto-injector injection prn     • famotidine (PEPCID) 20 MG tablet Take 20 mg by mouth Daily.     • fluticasone (FLONASE) 50 MCG/ACT nasal spray 2 sprays into the nostril(s) as directed by provider Daily.     • ibuprofen (ADVIL,MOTRIN) 800 MG tablet TAKE 1 TABLET BY MOUTH UP TO THREE TIMES A DAY WITH FOOD  5   • montelukast (SINGULAIR) 10 MG tablet Take 1 tablet by mouth every night.     • NORTREL 7/7/7 0.5/0.75/1-35 MG-MCG per  tablet Every Night.     • omeprazole (priLOSEC) 40 MG capsule Take 40 mg by mouth Every Night.     • ondansetron (ZOFRAN) 8 MG tablet Take  by mouth Every 8 (Eight) Hours As Needed for Nausea or Vomiting.     • sertraline (ZOLOFT) 100 MG tablet Take 100 mg by mouth Daily.     • VENTOLIN  (90 Base) MCG/ACT inhaler INHALE 2 PUFFS BY MOUTH FOUR TIMES A DAY AS NEEDED FOR COUGH WHEEZE  11   • vitamin E 200 UNIT capsule Take 400 Units by mouth Daily.  0     No current facility-administered medications on file prior to visit.       Peanuts [peanut oil], Azithromycin, and Shellfish-derived products    Past Medical History:   Diagnosis Date   • Anxiety    • Arthritis    • Bulging lumbar disc    • COVID-19    • Depression    • GERD (gastroesophageal reflux disease)    • Palpitations        Social History     Socioeconomic History   • Marital status: Single     Spouse name: Not on file   • Number of children: Not on file   • Years of education: Not on file   • Highest education level: Not on file   Tobacco Use   • Smoking status: Never Smoker   • Smokeless tobacco: Never Used   Substance and Sexual Activity   • Alcohol use: No   • Drug use: No   • Sexual activity: Not Currently     Comment: On the pill to control periods       Family History   Problem Relation Age of Onset   • Diabetes Mother         borderline   • COPD Mother    • Coronary artery disease Mother    • Deep vein thrombosis Mother    • Depression Mother    • Hypertension Mother         essential   • Hyperlipidemia Mother    • Heart disease Mother    • COPD Father    • LACEY disease Father    • Lung cancer Father    • Heart attack Paternal Aunt         Congestive haert failure   • Heart disease Paternal Aunt    • Heart failure Paternal Aunt    • Heart attack Paternal Uncle         Heart  attack   • Heart failure Paternal Uncle    • Heart attack Paternal Uncle         Massive heart attack,    • Heart failure Paternal Uncle    • Heart attack Paternal  "Grandfather         Massive heart attack,    • Heart disease Paternal Grandfather    • Heart attack Paternal Grandmother         Massive hesrt attack,    • Heart disease Paternal Grandmother    • Heart failure Paternal Uncle         Massive heart attack,        Review of Systems   Constitutional: Negative.  Negative for chills, fatigue and fever.   HENT: Positive for congestion and rhinorrhea. Negative for sore throat.    Eyes: Positive for visual disturbance (glasses).   Respiratory: Negative for chest tightness, shortness of breath and wheezing.    Cardiovascular: Positive for palpitations. Negative for chest pain and leg swelling.   Gastrointestinal: Negative.    Endocrine: Negative.    Genitourinary: Negative.    Musculoskeletal: Positive for back pain. Negative for arthralgias and neck pain.   Skin: Negative.  Negative for rash and wound.   Allergic/Immunologic: Positive for environmental allergies.   Neurological: Positive for dizziness and headaches. Negative for weakness and numbness.   Hematological: Bruises/bleeds easily (bruises).   Psychiatric/Behavioral: Negative.  Negative for sleep disturbance.       Objective   Vitals:    21 1104   BP: 110/63   BP Location: Left arm   Patient Position: Sitting   Pulse: 72   SpO2: 99%   Weight: 101 kg (223 lb 6.4 oz)   Height: 170.2 cm (67.01\")      /63 (BP Location: Left arm, Patient Position: Sitting)   Pulse 72   Ht 170.2 cm (67.01\")   Wt 101 kg (223 lb 6.4 oz)   SpO2 99%   BMI 34.98 kg/m²    Lab Results (most recent)     None        Physical Exam  Vitals and nursing note reviewed.   Constitutional:       General: She is not in acute distress.     Appearance: She is well-developed.   HENT:      Head: Normocephalic and atraumatic.   Eyes:      Conjunctiva/sclera: Conjunctivae normal.      Pupils: Pupils are equal, round, and reactive to light.   Neck:      Vascular: No JVD.      Trachea: No tracheal deviation. "   Cardiovascular:      Rate and Rhythm: Normal rate and regular rhythm.      Heart sounds: Normal heart sounds.   Pulmonary:      Effort: Pulmonary effort is normal.      Breath sounds: Normal breath sounds.   Abdominal:      General: Bowel sounds are normal. There is no distension.      Palpations: Abdomen is soft. There is no mass.      Tenderness: There is no abdominal tenderness. There is no guarding or rebound.   Musculoskeletal:         General: No tenderness or deformity. Normal range of motion.      Cervical back: Normal range of motion and neck supple.   Skin:     General: Skin is warm and dry.      Coloration: Skin is not pale.      Findings: No erythema or rash.   Neurological:      Mental Status: She is alert and oriented to person, place, and time.   Psychiatric:         Behavior: Behavior normal.         Thought Content: Thought content normal.         Judgment: Judgment normal.           Procedure   Procedures       Assessment/Plan      Diagnosis Plan   1. Chest pain, atypical     2. Palpitations     3. Shortness of breath     1.  Symptoms of chest pain, palpitations, and dyspnea have now minimized.  The patient feels that she is doing well.  She feels that all symptoms were directly related to anxiety.    2.  Her stress test, echo, and event monitor findings all were benign.  With improvement in clinical course, I do not feel anything further is indicated at this time.    3.  Diltiazem has been very successful in treatment of the patient's symptoms of palpitations.  She feels very well on current dosing.  We would make no adjustments to her diltiazem regimen otherwise.    4.  Nothing further at this time and we will continue to see the patient on 6-month follow-ups.  She will call for any complications.                      Electronically signed by:

## 2022-02-15 DIAGNOSIS — R00.2 PALPITATIONS: Primary | ICD-10-CM

## 2022-02-15 DIAGNOSIS — R00.0 TACHYCARDIA: ICD-10-CM

## 2022-02-15 DIAGNOSIS — I10 ESSENTIAL HYPERTENSION: ICD-10-CM

## 2022-02-15 DIAGNOSIS — R06.02 SHORTNESS OF BREATH: ICD-10-CM

## 2022-02-15 RX ORDER — DILTIAZEM HYDROCHLORIDE 60 MG/1
CAPSULE, EXTENDED RELEASE ORAL
Qty: 60 CAPSULE | Refills: 5 | Status: SHIPPED | OUTPATIENT
Start: 2022-02-15 | End: 2022-09-23

## 2022-03-08 ENCOUNTER — OFFICE VISIT (OUTPATIENT)
Dept: CARDIOLOGY | Facility: CLINIC | Age: 27
End: 2022-03-08

## 2022-03-08 VITALS
BODY MASS INDEX: 35.63 KG/M2 | HEIGHT: 67 IN | HEART RATE: 77 BPM | WEIGHT: 227 LBS | SYSTOLIC BLOOD PRESSURE: 117 MMHG | DIASTOLIC BLOOD PRESSURE: 76 MMHG | OXYGEN SATURATION: 97 %

## 2022-03-08 DIAGNOSIS — R06.02 SHORTNESS OF BREATH: ICD-10-CM

## 2022-03-08 DIAGNOSIS — R00.2 PALPITATIONS: Primary | ICD-10-CM

## 2022-03-08 DIAGNOSIS — R00.0 TACHYCARDIA: ICD-10-CM

## 2022-03-08 PROCEDURE — 99213 OFFICE O/P EST LOW 20 MIN: CPT | Performed by: PHYSICIAN ASSISTANT

## 2022-03-08 PROCEDURE — 93000 ELECTROCARDIOGRAM COMPLETE: CPT | Performed by: PHYSICIAN ASSISTANT

## 2022-03-08 NOTE — PROGRESS NOTES
Problem list     Subjective   Essence Law is a 26 y.o. female     Chief Complaint   Patient presents with   • Follow-up     6 month        HPI  The patient presents back to the clinic today for routine follow-up.  We have seen her historically in the setting of chronic chest pain and palpitations.  Previous evaluation with stress test, echo, and event monitor findings were largely benign.  We have since treated her with Cardizem as dosed below.  Symptoms of palpitations have minimized.  Her chest discomfort tends to occur with stress and anxiety.  She has no current dizziness or syncope.  She has no failure symptoms.  She is tolerating diltiazem and overall feels improved on that therapy.  She has no further complaints at this time.    Current Outpatient Medications on File Prior to Visit   Medication Sig Dispense Refill   • cetirizine (zyrTEC) 10 MG tablet cetirizine 10 mg tablet   Take 1 tablet every day by oral route.     • dilTIAZem SR (CARDIZEM SR) 60 MG 12 hr capsule TAKE ONE CAPSULE BY MOUTH 2 TIMES A DAY 60 capsule 5   • fluticasone (FLONASE) 50 MCG/ACT nasal spray 2 sprays into the nostril(s) as directed by provider Daily.     • montelukast (SINGULAIR) 10 MG tablet Take 1 tablet by mouth every night.     • NORTREL 7/7/7 0.5/0.75/1-35 MG-MCG per tablet Every Night.     • omeprazole (priLOSEC) 40 MG capsule Take 40 mg by mouth Every Night.     • sertraline (ZOLOFT) 100 MG tablet Take 100 mg by mouth Daily.     • VENTOLIN  (90 Base) MCG/ACT inhaler INHALE 2 PUFFS BY MOUTH FOUR TIMES A DAY AS NEEDED FOR COUGH WHEEZE  11     No current facility-administered medications on file prior to visit.       Peanuts [peanut oil], Azithromycin, and Shellfish-derived products    Past Medical History:   Diagnosis Date   • Anxiety    • Arthritis    • Bulging lumbar disc    • COVID-19    • Depression    • GERD (gastroesophageal reflux disease)    • Palpitations        Social History     Socioeconomic History   •  Marital status: Single   Tobacco Use   • Smoking status: Never Smoker   • Smokeless tobacco: Never Used   Substance and Sexual Activity   • Alcohol use: No   • Drug use: No   • Sexual activity: Not Currently     Comment: On the pill to control periods       Family History   Problem Relation Age of Onset   • Diabetes Mother         borderline   • COPD Mother    • Coronary artery disease Mother    • Deep vein thrombosis Mother    • Depression Mother    • Hypertension Mother         essential   • Hyperlipidemia Mother    • Heart disease Mother    • COPD Father    • LACEY disease Father    • Lung cancer Father    • Heart attack Paternal Aunt         Congestive haert failure   • Heart disease Paternal Aunt    • Heart failure Paternal Aunt    • Heart attack Paternal Uncle         Heart  attack   • Heart failure Paternal Uncle    • Heart attack Paternal Uncle         Massive heart attack,    • Heart failure Paternal Uncle    • Heart attack Paternal Grandfather         Massive heart attack,    • Heart disease Paternal Grandfather    • Heart attack Paternal Grandmother         Massive hesrt attack,    • Heart disease Paternal Grandmother    • Heart failure Paternal Uncle         Massive heart attack,        Review of Systems   Constitutional: Positive for fatigue. Negative for chills and fever.   HENT: Negative for congestion, rhinorrhea and sore throat.    Eyes: Negative.  Negative for visual disturbance.   Respiratory: Positive for chest tightness and shortness of breath. Negative for wheezing.    Cardiovascular: Positive for chest pain. Negative for palpitations and leg swelling.   Gastrointestinal: Negative.    Endocrine: Negative.    Genitourinary: Negative.    Musculoskeletal: Positive for back pain. Negative for arthralgias and neck pain.   Skin: Negative.  Negative for rash and wound.   Allergic/Immunologic: Positive for environmental allergies.   Neurological: Positive for dizziness and  "headaches. Negative for weakness and numbness.   Hematological: Bruises/bleeds easily (bruises).   Psychiatric/Behavioral: Negative.  Negative for sleep disturbance.       Objective   Vitals:    03/08/22 1353   BP: 117/76   BP Location: Left arm   Patient Position: Sitting   Pulse: 77   SpO2: 97%   Weight: 103 kg (227 lb)   Height: 170.2 cm (67.01\")      /76 (BP Location: Left arm, Patient Position: Sitting)   Pulse 77   Ht 170.2 cm (67.01\")   Wt 103 kg (227 lb)   SpO2 97%   BMI 35.54 kg/m²    Lab Results (most recent)     None        Physical Exam  Vitals and nursing note reviewed.   Constitutional:       General: She is not in acute distress.     Appearance: She is well-developed.   HENT:      Head: Normocephalic and atraumatic.   Eyes:      Conjunctiva/sclera: Conjunctivae normal.      Pupils: Pupils are equal, round, and reactive to light.   Neck:      Vascular: No JVD.      Trachea: No tracheal deviation.   Cardiovascular:      Rate and Rhythm: Normal rate and regular rhythm.      Heart sounds: Normal heart sounds.   Pulmonary:      Effort: Pulmonary effort is normal.      Breath sounds: Normal breath sounds.   Abdominal:      General: Bowel sounds are normal. There is no distension.      Palpations: Abdomen is soft. There is no mass.      Tenderness: There is no abdominal tenderness. There is no guarding or rebound.   Musculoskeletal:         General: No tenderness or deformity. Normal range of motion.      Cervical back: Normal range of motion and neck supple.   Skin:     General: Skin is warm and dry.      Coloration: Skin is not pale.      Findings: No erythema or rash.   Neurological:      Mental Status: She is alert and oriented to person, place, and time.   Psychiatric:         Behavior: Behavior normal.         Thought Content: Thought content normal.         Judgment: Judgment normal.           Procedure     ECG 12 Lead    Date/Time: 3/8/2022 1:59 PM  Performed by: Flakito Patel, " PA  Authorized by: Flakito Patel PA   Comparison: compared with previous ECG from 9/23/2021  Comparison to previous ECG: Sinus rhythm, rate 72, normal axis, no acute changes noted.                 Assessment/Plan      Diagnosis Plan   1. Palpitations     2. Tachycardia     3. Shortness of breath       1.  From cardiovascular standpoint, the patient appears to be doing well.  She describes atypical chest pain.  Dyspnea is at baseline.  Palpitations/tachycardia have remained well treated with diltiazem therapy.  I would make no adjustments in medications at this time as the patient is doing well.    2.  Recent evaluation with stress and echo studies have been benign.  I do not feel further evaluation is warranted.    3.  For change in clinical course, she will call immediately.  Otherwise, we will continue to see her on 6-month intervals.                      Electronically signed by:

## 2022-04-18 ENCOUNTER — OFFICE VISIT (OUTPATIENT)
Dept: CARDIOLOGY | Facility: CLINIC | Age: 27
End: 2022-04-18

## 2022-04-18 VITALS
OXYGEN SATURATION: 99 % | SYSTOLIC BLOOD PRESSURE: 132 MMHG | HEIGHT: 67 IN | BODY MASS INDEX: 35.91 KG/M2 | HEART RATE: 72 BPM | WEIGHT: 228.8 LBS | DIASTOLIC BLOOD PRESSURE: 80 MMHG

## 2022-04-18 DIAGNOSIS — R00.2 PALPITATIONS: Primary | ICD-10-CM

## 2022-04-18 DIAGNOSIS — R42 DIZZINESS: ICD-10-CM

## 2022-04-18 DIAGNOSIS — R06.02 SHORTNESS OF BREATH: ICD-10-CM

## 2022-04-18 DIAGNOSIS — R55 PRE-SYNCOPE: ICD-10-CM

## 2022-04-18 PROCEDURE — 99213 OFFICE O/P EST LOW 20 MIN: CPT | Performed by: PHYSICIAN ASSISTANT

## 2022-04-18 NOTE — PROGRESS NOTES
Problem list     Subjective   Essence Law is a 26 y.o. female     Chief Complaint   Patient presents with   • Follow-up     1 month / records in chart    • Dizziness   • Loss of Consciousness       HPI  The patient presents back to the clinic today for evaluation.  This very pleasant patient was seen last in March where she had stable symptoms.  She has had history of chronic chest pain and palpitations.  She had an evaluation last summer where her stress test, echo, and event monitor findings were unremarkable.  She was referred back however because of episodes of dizziness and presyncope.  Her dizziness and symptoms otherwise tend not to be positional, and orthostatic blood pressure checks today were benign.  She reports that typically at work, she will have immediate onset of weakness and dizziness.  She gets to the point of almost passing out, and then symptoms resolved spontaneously.  She has associated palpitations.  In one particular episode, she woke up and her Fitbit recorded a heart rate of 220 beats a minute.  She had her feeling of dizziness and presyncope at that time.  She was directed onto the emergency room where apparently work-up was benign.  She was discharged from evaluation through the emergency room and advised to follow-up with us today.  She presents today in that setting.  She has ongoing episodes of dizziness typically associated with palpitations and tachycardia.  She has no other neurologic symptoms associated with these episodes.  She has no diaphoresis nor nausea associated with these episodes.  Rarely she has chest discomfort, nothing really of significance.  She would like further evaluation at this time.    Current Outpatient Medications on File Prior to Visit   Medication Sig Dispense Refill   • cetirizine (zyrTEC) 10 MG tablet cetirizine 10 mg tablet   Take 1 tablet every day by oral route.     • dilTIAZem SR (CARDIZEM SR) 60 MG 12 hr capsule TAKE ONE CAPSULE BY MOUTH 2 TIMES  A DAY 60 capsule 5   • montelukast (SINGULAIR) 10 MG tablet Take 1 tablet by mouth every night.     • NORTREL //7 0.5/0.75/1-35 MG-MCG per tablet Every Night.     • omeprazole (priLOSEC) 40 MG capsule Take 40 mg by mouth Every Night.     • sertraline (ZOLOFT) 100 MG tablet Take 100 mg by mouth Daily.     • VENTOLIN  (90 Base) MCG/ACT inhaler INHALE 2 PUFFS BY MOUTH FOUR TIMES A DAY AS NEEDED FOR COUGH WHEEZE  11   • fluticasone (FLONASE) 50 MCG/ACT nasal spray 2 sprays into the nostril(s) as directed by provider Daily.       No current facility-administered medications on file prior to visit.       Peanuts [peanut oil], Azithromycin, and Shellfish-derived products    Past Medical History:   Diagnosis Date   • Anxiety    • Arthritis    • Bulging lumbar disc    • COVID-19    • Depression    • GERD (gastroesophageal reflux disease)    • Palpitations        Social History     Socioeconomic History   • Marital status: Single   Tobacco Use   • Smoking status: Never Smoker   • Smokeless tobacco: Never Used   Substance and Sexual Activity   • Alcohol use: No   • Drug use: No   • Sexual activity: Not Currently     Comment: On the pill to control periods       Family History   Problem Relation Age of Onset   • Diabetes Mother         borderline   • COPD Mother    • Coronary artery disease Mother    • Deep vein thrombosis Mother    • Depression Mother    • Hypertension Mother         essential   • Hyperlipidemia Mother    • Heart disease Mother    • COPD Father    • LACEY disease Father    • Lung cancer Father    • Heart attack Paternal Aunt         Congestive haert failure   • Heart disease Paternal Aunt    • Heart failure Paternal Aunt    • Heart attack Paternal Uncle         Heart  attack   • Heart failure Paternal Uncle    • Heart attack Paternal Uncle         Massive heart attack,    • Heart failure Paternal Uncle    • Heart attack Paternal Grandfather         Massive heart attack,    • Heart  "disease Paternal Grandfather    • Heart attack Paternal Grandmother         Massive hesrt attack,    • Heart disease Paternal Grandmother    • Heart failure Paternal Uncle         Massive heart attack,        Review of Systems   Constitutional: Positive for fatigue. Negative for chills and fever.   HENT: Negative.  Negative for congestion, rhinorrhea and sore throat.    Eyes: Negative.  Negative for visual disturbance.   Respiratory: Positive for chest tightness and shortness of breath. Negative for wheezing.    Cardiovascular: Positive for palpitations. Negative for chest pain and leg swelling.   Gastrointestinal: Negative.    Endocrine: Negative.    Genitourinary: Negative.    Musculoskeletal: Positive for back pain. Negative for arthralgias and neck pain.   Skin: Negative.  Negative for rash and wound.   Allergic/Immunologic: Positive for environmental allergies.   Neurological: Positive for dizziness, syncope, weakness and light-headedness. Negative for numbness.   Hematological: Bruises/bleeds easily (brusies / bleeds).   Psychiatric/Behavioral: Negative.  Negative for sleep disturbance.       Objective   Vitals:    22 0907   BP: 132/80   BP Location: Left arm   Patient Position: Sitting   Pulse: 72   SpO2: 99%   Weight: 104 kg (228 lb 12.8 oz)   Height: 170.2 cm (67.01\")      /80 (BP Location: Left arm, Patient Position: Sitting)   Pulse 72   Ht 170.2 cm (67.01\")   Wt 104 kg (228 lb 12.8 oz)   SpO2 99%   BMI 35.82 kg/m²    Lab Results (most recent)     None        Physical Exam  Vitals and nursing note reviewed.   Constitutional:       General: She is not in acute distress.     Appearance: She is well-developed.   HENT:      Head: Normocephalic and atraumatic.   Eyes:      Conjunctiva/sclera: Conjunctivae normal.      Pupils: Pupils are equal, round, and reactive to light.   Neck:      Vascular: No JVD.      Trachea: No tracheal deviation.   Cardiovascular:      Rate and " Rhythm: Normal rate and regular rhythm.      Heart sounds: Normal heart sounds.   Pulmonary:      Effort: Pulmonary effort is normal.      Breath sounds: Normal breath sounds.   Abdominal:      General: Bowel sounds are normal. There is no distension.      Palpations: Abdomen is soft. There is no mass.      Tenderness: There is no abdominal tenderness. There is no guarding or rebound.   Musculoskeletal:         General: No tenderness or deformity. Normal range of motion.      Cervical back: Normal range of motion and neck supple.   Skin:     General: Skin is warm and dry.      Coloration: Skin is not pale.      Findings: No erythema or rash.   Neurological:      Mental Status: She is alert and oriented to person, place, and time.   Psychiatric:         Behavior: Behavior normal.         Thought Content: Thought content normal.         Judgment: Judgment normal.           Procedure   Procedures       Assessment/Plan      Diagnosis Plan   1. Palpitations  Cardiac Event Monitor   2. Shortness of breath  Cardiac Event Monitor   3. Dizziness  Cardiac Event Monitor   4. Pre-syncope  Cardiac Event Monitor     1.  The patient presents back for evaluation because of episodes of dizziness and presyncope.  She had an evaluation for an assortment of symptoms last summer.  Stress test, echo, and event monitor findings were unremarkable at that time.  She notes however she was not having current episodes of dizziness and presyncope of any significance at that time and never had any of these episodes while wearing the event monitor.    2.  She was evaluated recently through the emergency room for what she reports is a heart rate of 220 beats a minute, with associated dizziness and presyncope which are her main concerns at this time.  I would like to attempt to repeat an event monitor x2 weeks as these occur every few days now.  We can evaluate for rate or rhythm disturbance issues via the same.    3.  I would not repeat cardiac  evaluation otherwise for now.  Again, her stress and echo studies were unremarkable just several months ago.  If event monitor findings are unremarkable and symptoms persist, I would at that time consider a tilt table evaluation.    4.  Orthostatic blood pressure evaluation today was very much benign.  We have reviewed this in detail with her.  Recent laboratories with her primary care provider and EKG otherwise were unremarkable as well.    5.  We will see her in follow-up of event monitor findings and recommend her further at that time.  She will call for continued or worsened symptoms.                   Electronically signed by:

## 2022-09-19 ENCOUNTER — OFFICE VISIT (OUTPATIENT)
Dept: CARDIOLOGY | Facility: CLINIC | Age: 27
End: 2022-09-19

## 2022-09-19 VITALS
DIASTOLIC BLOOD PRESSURE: 71 MMHG | OXYGEN SATURATION: 98 % | HEIGHT: 67 IN | BODY MASS INDEX: 35.63 KG/M2 | SYSTOLIC BLOOD PRESSURE: 112 MMHG | HEART RATE: 74 BPM | WEIGHT: 227 LBS

## 2022-09-19 DIAGNOSIS — R42 DIZZINESS: ICD-10-CM

## 2022-09-19 DIAGNOSIS — R06.02 SHORTNESS OF BREATH: ICD-10-CM

## 2022-09-19 DIAGNOSIS — R00.2 PALPITATIONS: Primary | ICD-10-CM

## 2022-09-19 PROCEDURE — 99213 OFFICE O/P EST LOW 20 MIN: CPT | Performed by: PHYSICIAN ASSISTANT

## 2022-09-19 RX ORDER — AMOXICILLIN AND CLAVULANATE POTASSIUM 875; 125 MG/1; MG/1
1 TABLET, FILM COATED ORAL 2 TIMES DAILY
COMMUNITY
Start: 2022-09-14

## 2022-09-19 NOTE — PROGRESS NOTES
Problem list     Subjective   Essence Law is a 26 y.o. female     Chief Complaint   Patient presents with   • Dizziness       HPI  The patient presents in the clinic today for routine evaluation and follow-up.  We have seen this patient historically for chronic palpitations and chronic episodes of dizziness.  To this time, stress test, echo, and event monitor findings have been benign.  With recurrent episodes at last evaluation she was again scheduled for an event monitor.  This indicated no rate or rhythm disturbance issues which potentially could explain symptoms.  The patient tells me she now feels better.  Episodes of dizziness and presyncope have now minimized.  She reports only rare palpitations now.  She has no further chest pain.  Dyspnea is at baseline.  She has no further complaints and feels that mostly she is doing well.        Current Outpatient Medications on File Prior to Visit   Medication Sig Dispense Refill   • amoxicillin-clavulanate (AUGMENTIN) 875-125 MG per tablet Take 1 tablet by mouth 2 (Two) Times a Day. 7 days     • cetirizine (zyrTEC) 10 MG tablet cetirizine 10 mg tablet   Take 1 tablet every day by oral route.     • dilTIAZem SR (CARDIZEM SR) 60 MG 12 hr capsule TAKE ONE CAPSULE BY MOUTH 2 TIMES A DAY 60 capsule 5   • fluticasone (FLONASE) 50 MCG/ACT nasal spray 2 sprays into the nostril(s) as directed by provider Daily.     • NORTREL 7/7/7 0.5/0.75/1-35 MG-MCG per tablet Every Night.     • omeprazole (priLOSEC) 40 MG capsule Take 40 mg by mouth Every Night.     • sertraline (ZOLOFT) 100 MG tablet Take 100 mg by mouth Daily.     • VENTOLIN  (90 Base) MCG/ACT inhaler INHALE 2 PUFFS BY MOUTH FOUR TIMES A DAY AS NEEDED FOR COUGH WHEEZE  11   • montelukast (SINGULAIR) 10 MG tablet Take 1 tablet by mouth every night.       No current facility-administered medications on file prior to visit.       Peanuts [peanut oil], Azithromycin, and Shellfish-derived products    Past Medical  History:   Diagnosis Date   • Anxiety    • Arthritis    • Bulging lumbar disc    • COVID-19    • Depression    • GERD (gastroesophageal reflux disease)    • Palpitations        Social History     Socioeconomic History   • Marital status: Single   Tobacco Use   • Smoking status: Never Smoker   • Smokeless tobacco: Never Used   Substance and Sexual Activity   • Alcohol use: No   • Drug use: No   • Sexual activity: Not Currently     Comment: On the pill to control periods       Family History   Problem Relation Age of Onset   • Diabetes Mother         borderline   • COPD Mother    • Coronary artery disease Mother    • Deep vein thrombosis Mother    • Depression Mother    • Hypertension Mother         essential   • Hyperlipidemia Mother    • Heart disease Mother    • COPD Father    • LACEY disease Father    • Lung cancer Father    • Heart attack Paternal Aunt         Congestive haert failure   • Heart disease Paternal Aunt    • Heart failure Paternal Aunt    • Heart attack Paternal Uncle         Heart  attack   • Heart failure Paternal Uncle    • Heart attack Paternal Uncle         Massive heart attack,    • Heart failure Paternal Uncle    • Heart attack Paternal Grandfather         Massive heart attack,    • Heart disease Paternal Grandfather    • Heart attack Paternal Grandmother         Massive hesrt attack,    • Heart disease Paternal Grandmother    • Heart failure Paternal Uncle         Massive heart attack,        Review of Systems   Constitutional: Positive for fatigue. Negative for chills and fever.   HENT: Negative.  Negative for congestion and sinus pressure.    Respiratory: Negative.  Negative for chest tightness and shortness of breath.    Cardiovascular: Positive for chest pain (occasionally) and palpitations (occasionally flutters). Negative for leg swelling.   Gastrointestinal: Positive for constipation, diarrhea and nausea. Negative for abdominal pain, blood in stool and  "vomiting.   Endocrine: Negative.  Negative for cold intolerance and heat intolerance.   Genitourinary: Negative.  Negative for dysuria, frequency, hematuria and urgency.   Neurological: Positive for dizziness. Negative for syncope and light-headedness.   Psychiatric/Behavioral: Negative.  Negative for sleep disturbance (denies waking with soa or cp).       Objective   Vitals:    09/19/22 1418   BP: 112/71   BP Location: Left arm   Patient Position: Sitting   Pulse: 74   SpO2: 98%   Weight: 103 kg (227 lb)   Height: 170.2 cm (67\")      /71 (BP Location: Left arm, Patient Position: Sitting)   Pulse 74   Ht 170.2 cm (67\")   Wt 103 kg (227 lb)   SpO2 98%   BMI 35.55 kg/m²    Lab Results (most recent)     None        Physical Exam  Vitals and nursing note reviewed.   Constitutional:       General: She is not in acute distress.     Appearance: She is well-developed.   HENT:      Head: Normocephalic and atraumatic.   Eyes:      Conjunctiva/sclera: Conjunctivae normal.      Pupils: Pupils are equal, round, and reactive to light.   Neck:      Vascular: No JVD.      Trachea: No tracheal deviation.   Cardiovascular:      Rate and Rhythm: Normal rate and regular rhythm.      Heart sounds: Normal heart sounds.   Pulmonary:      Effort: Pulmonary effort is normal.      Breath sounds: Normal breath sounds.   Abdominal:      General: Bowel sounds are normal. There is no distension.      Palpations: Abdomen is soft. There is no mass.      Tenderness: There is no abdominal tenderness. There is no guarding or rebound.   Musculoskeletal:         General: No tenderness or deformity. Normal range of motion.      Cervical back: Normal range of motion and neck supple.   Skin:     General: Skin is warm and dry.      Coloration: Skin is not pale.      Findings: No erythema or rash.   Neurological:      Mental Status: She is alert and oriented to person, place, and time.   Psychiatric:         Behavior: Behavior normal.         " Thought Content: Thought content normal.         Judgment: Judgment normal.           Procedure   Procedures       Assessment & Plan      Diagnosis Plan   1. Palpitations     2. Dizziness     3. Shortness of breath       1.  At this time, the patient appears to be doing well from general cardiovascular standpoint.  Palpitations and tachycardia episodes have minimized.  Her dizziness has minimized as well.  Her event monitor and recent stress and echo studies have been benign.  As she is improved I do not feel further evaluation or work-up otherwise would be warranted.    2.  Her dyspnea remains at baseline.  She has had no recent chest pain.  She will call to the clinic for any change in clinical course.    3.  We will make no adjustments in medications.  We will anticipate still seeing her on 6-month intervals, but sooner for complications.           Essence IVONNE Law  reports that she has never smoked. She has never used smokeless tobacco..    Patient did not bring med list or medicine bottles to appointment, med list has been reviewed and updated based on patient's knowledge of their meds.         Electronically signed by:

## 2022-09-23 DIAGNOSIS — R00.2 PALPITATIONS: ICD-10-CM

## 2022-09-23 DIAGNOSIS — I10 ESSENTIAL HYPERTENSION: ICD-10-CM

## 2022-09-23 DIAGNOSIS — R00.0 TACHYCARDIA: ICD-10-CM

## 2022-09-23 RX ORDER — DILTIAZEM HYDROCHLORIDE 60 MG/1
CAPSULE, EXTENDED RELEASE ORAL
Qty: 60 CAPSULE | Refills: 5 | Status: SHIPPED | OUTPATIENT
Start: 2022-09-23

## 2023-10-16 DIAGNOSIS — I10 ESSENTIAL HYPERTENSION: ICD-10-CM

## 2023-10-16 DIAGNOSIS — R00.0 TACHYCARDIA: ICD-10-CM

## 2023-10-16 DIAGNOSIS — R00.2 PALPITATIONS: ICD-10-CM

## 2023-10-16 RX ORDER — DILTIAZEM HYDROCHLORIDE 60 MG/1
CAPSULE, EXTENDED RELEASE ORAL
Qty: 60 CAPSULE | Refills: 5 | OUTPATIENT
Start: 2023-10-16

## 2023-12-26 DIAGNOSIS — I10 ESSENTIAL HYPERTENSION: ICD-10-CM

## 2023-12-26 DIAGNOSIS — R00.2 PALPITATIONS: ICD-10-CM

## 2023-12-26 DIAGNOSIS — R00.0 TACHYCARDIA: ICD-10-CM

## 2023-12-26 RX ORDER — DILTIAZEM HYDROCHLORIDE 60 MG/1
CAPSULE, EXTENDED RELEASE ORAL
Qty: 60 CAPSULE | Refills: 5 | Status: SHIPPED | OUTPATIENT
Start: 2023-12-26

## 2024-06-11 DIAGNOSIS — R00.2 PALPITATIONS: ICD-10-CM

## 2024-06-11 DIAGNOSIS — I10 ESSENTIAL HYPERTENSION: ICD-10-CM

## 2024-06-11 DIAGNOSIS — R00.0 TACHYCARDIA: ICD-10-CM

## 2024-06-11 RX ORDER — DILTIAZEM HYDROCHLORIDE 60 MG/1
CAPSULE, EXTENDED RELEASE ORAL
Qty: 60 CAPSULE | Refills: 5 | Status: SHIPPED | OUTPATIENT
Start: 2024-06-11